# Patient Record
Sex: FEMALE | Race: WHITE | NOT HISPANIC OR LATINO | Employment: FULL TIME | URBAN - METROPOLITAN AREA
[De-identification: names, ages, dates, MRNs, and addresses within clinical notes are randomized per-mention and may not be internally consistent; named-entity substitution may affect disease eponyms.]

---

## 2017-05-06 ENCOUNTER — COMMUNICATION - HEALTHEAST (OUTPATIENT)
Dept: FAMILY MEDICINE | Facility: CLINIC | Age: 47
End: 2017-05-06

## 2017-05-08 ENCOUNTER — HOSPITAL ENCOUNTER (OUTPATIENT)
Dept: MAMMOGRAPHY | Facility: HOSPITAL | Age: 47
Discharge: HOME | End: 2017-05-08
Attending: FAMILY MEDICINE

## 2017-05-08 DIAGNOSIS — Z12.31 VISIT FOR SCREENING MAMMOGRAM: ICD-10-CM

## 2017-10-02 ENCOUNTER — COMMUNICATION - HEALTHEAST (OUTPATIENT)
Dept: FAMILY MEDICINE | Facility: CLINIC | Age: 47
End: 2017-10-02

## 2017-10-02 DIAGNOSIS — R52 PAIN: ICD-10-CM

## 2017-10-30 ENCOUNTER — COMMUNICATION - HEALTHEAST (OUTPATIENT)
Dept: FAMILY MEDICINE | Facility: CLINIC | Age: 47
End: 2017-10-30

## 2017-10-30 DIAGNOSIS — Z79.899 MEDICATION MANAGEMENT: ICD-10-CM

## 2018-01-18 ENCOUNTER — COMMUNICATION - HEALTHEAST (OUTPATIENT)
Dept: FAMILY MEDICINE | Facility: CLINIC | Age: 48
End: 2018-01-18

## 2018-01-18 DIAGNOSIS — Z79.899 MEDICATION MANAGEMENT: ICD-10-CM

## 2018-04-06 ENCOUNTER — OFFICE VISIT - HEALTHEAST (OUTPATIENT)
Dept: FAMILY MEDICINE | Facility: CLINIC | Age: 48
End: 2018-04-06

## 2018-04-06 DIAGNOSIS — S93.409A ANKLE SPRAIN: ICD-10-CM

## 2018-05-21 ENCOUNTER — HOSPITAL ENCOUNTER (OUTPATIENT)
Dept: MAMMOGRAPHY | Facility: CLINIC | Age: 48
Discharge: HOME | End: 2018-05-21
Attending: FAMILY MEDICINE

## 2018-05-21 DIAGNOSIS — Z12.31 VISIT FOR SCREENING MAMMOGRAM: ICD-10-CM

## 2019-03-28 ENCOUNTER — COMMUNICATION - HEALTHEAST (OUTPATIENT)
Dept: FAMILY MEDICINE | Facility: CLINIC | Age: 49
End: 2019-03-28

## 2019-03-28 DIAGNOSIS — Z79.899 MEDICATION MANAGEMENT: ICD-10-CM

## 2019-04-10 ENCOUNTER — COMMUNICATION - HEALTHEAST (OUTPATIENT)
Dept: FAMILY MEDICINE | Facility: CLINIC | Age: 49
End: 2019-04-10

## 2019-04-10 DIAGNOSIS — R52 PAIN: ICD-10-CM

## 2019-06-07 ENCOUNTER — HOSPITAL ENCOUNTER (OUTPATIENT)
Dept: MAMMOGRAPHY | Facility: CLINIC | Age: 49
Discharge: HOME | End: 2019-06-07
Attending: FAMILY MEDICINE

## 2019-06-07 DIAGNOSIS — Z12.31 VISIT FOR SCREENING MAMMOGRAM: ICD-10-CM

## 2019-07-16 ENCOUNTER — COMMUNICATION - HEALTHEAST (OUTPATIENT)
Dept: FAMILY MEDICINE | Facility: CLINIC | Age: 49
End: 2019-07-16

## 2019-07-17 ENCOUNTER — OFFICE VISIT - HEALTHEAST (OUTPATIENT)
Dept: FAMILY MEDICINE | Facility: CLINIC | Age: 49
End: 2019-07-17

## 2019-07-17 DIAGNOSIS — R19.7 DIARRHEA, UNSPECIFIED TYPE: ICD-10-CM

## 2019-07-18 ENCOUNTER — AMBULATORY - HEALTHEAST (OUTPATIENT)
Dept: LAB | Facility: CLINIC | Age: 49
End: 2019-07-18

## 2019-07-18 DIAGNOSIS — R19.7 DIARRHEA, UNSPECIFIED TYPE: ICD-10-CM

## 2019-07-19 LAB
G LAMBLIA AG STL QL IA: NORMAL
O+P STL MICRO: NORMAL
O+P STL MICRO: NORMAL
SHIGA TOXIN 1: NEGATIVE
SHIGA TOXIN 2: NEGATIVE

## 2019-07-20 ENCOUNTER — COMMUNICATION - HEALTHEAST (OUTPATIENT)
Dept: FAMILY MEDICINE | Facility: CLINIC | Age: 49
End: 2019-07-20

## 2019-07-21 LAB — BACTERIA SPEC CULT: NORMAL

## 2019-08-04 ENCOUNTER — AMBULATORY - HEALTHEAST (OUTPATIENT)
Dept: FAMILY MEDICINE | Facility: CLINIC | Age: 49
End: 2019-08-04

## 2019-08-04 DIAGNOSIS — R19.7 DIARRHEA, UNSPECIFIED TYPE: ICD-10-CM

## 2019-10-15 ENCOUNTER — COMMUNICATION - HEALTHEAST (OUTPATIENT)
Dept: FAMILY MEDICINE | Facility: CLINIC | Age: 49
End: 2019-10-15

## 2019-10-15 DIAGNOSIS — L30.9 ECZEMA, UNSPECIFIED TYPE: ICD-10-CM

## 2019-10-17 ENCOUNTER — COMMUNICATION - HEALTHEAST (OUTPATIENT)
Dept: FAMILY MEDICINE | Facility: CLINIC | Age: 49
End: 2019-10-17

## 2019-10-17 DIAGNOSIS — L30.9 ECZEMA, UNSPECIFIED TYPE: ICD-10-CM

## 2020-01-16 ENCOUNTER — OFFICE VISIT - HEALTHEAST (OUTPATIENT)
Dept: FAMILY MEDICINE | Facility: CLINIC | Age: 50
End: 2020-01-16

## 2020-01-16 DIAGNOSIS — Z12.11 SCREENING FOR COLON CANCER: ICD-10-CM

## 2020-01-16 DIAGNOSIS — M25.551 HIP PAIN, RIGHT: ICD-10-CM

## 2020-01-16 DIAGNOSIS — M54.50 LOW BACK PAIN WITHOUT SCIATICA, UNSPECIFIED BACK PAIN LATERALITY, UNSPECIFIED CHRONICITY: ICD-10-CM

## 2020-01-16 DIAGNOSIS — Z78.0 MENOPAUSE: ICD-10-CM

## 2020-01-16 DIAGNOSIS — L30.8 OTHER ECZEMA: ICD-10-CM

## 2020-01-16 DIAGNOSIS — E01.0 THYROMEGALY: ICD-10-CM

## 2020-01-16 DIAGNOSIS — Z00.00 ROUTINE GENERAL MEDICAL EXAMINATION AT A HEALTH CARE FACILITY: ICD-10-CM

## 2020-01-16 LAB
ALBUMIN SERPL-MCNC: 4.4 G/DL (ref 3.5–5)
ALP SERPL-CCNC: 101 U/L (ref 45–120)
ALT SERPL W P-5'-P-CCNC: 16 U/L (ref 0–45)
ANION GAP SERPL CALCULATED.3IONS-SCNC: 12 MMOL/L (ref 5–18)
AST SERPL W P-5'-P-CCNC: 21 U/L (ref 0–40)
BILIRUB SERPL-MCNC: 1.2 MG/DL (ref 0–1)
BUN SERPL-MCNC: 17 MG/DL (ref 8–22)
CALCIUM SERPL-MCNC: 10.3 MG/DL (ref 8.5–10.5)
CHLORIDE BLD-SCNC: 104 MMOL/L (ref 98–107)
CHOLEST SERPL-MCNC: 221 MG/DL
CO2 SERPL-SCNC: 25 MMOL/L (ref 22–31)
CREAT SERPL-MCNC: 0.84 MG/DL (ref 0.6–1.1)
ERYTHROCYTE [DISTWIDTH] IN BLOOD BY AUTOMATED COUNT: 11.3 % (ref 11–14.5)
FASTING STATUS PATIENT QL REPORTED: YES
GFR SERPL CREATININE-BSD FRML MDRD: >60 ML/MIN/1.73M2
GLUCOSE BLD-MCNC: 101 MG/DL (ref 70–125)
HCT VFR BLD AUTO: 44.1 % (ref 35–47)
HDLC SERPL-MCNC: 84 MG/DL
HGB BLD-MCNC: 15.2 G/DL (ref 12–16)
LDLC SERPL CALC-MCNC: 119 MG/DL
MCH RBC QN AUTO: 31.5 PG (ref 27–34)
MCHC RBC AUTO-ENTMCNC: 34.5 G/DL (ref 32–36)
MCV RBC AUTO: 91 FL (ref 80–100)
PLATELET # BLD AUTO: 298 THOU/UL (ref 140–440)
PMV BLD AUTO: 6.9 FL (ref 7–10)
POTASSIUM BLD-SCNC: 4.3 MMOL/L (ref 3.5–5)
PROT SERPL-MCNC: 7.9 G/DL (ref 6–8)
RBC # BLD AUTO: 4.83 MILL/UL (ref 3.8–5.4)
SODIUM SERPL-SCNC: 141 MMOL/L (ref 136–145)
TRIGL SERPL-MCNC: 92 MG/DL
TSH SERPL DL<=0.005 MIU/L-ACNC: 1.23 UIU/ML (ref 0.3–5)
WBC: 6.5 THOU/UL (ref 4–11)

## 2020-01-16 RX ORDER — TRIAMCINOLONE ACETONIDE 5 MG/G
CREAM TOPICAL
Qty: 60 G | Refills: 1 | Status: SHIPPED | OUTPATIENT
Start: 2020-01-16

## 2020-01-16 ASSESSMENT — MIFFLIN-ST. JEOR: SCORE: 1300.18

## 2020-01-17 ENCOUNTER — AMBULATORY - HEALTHEAST (OUTPATIENT)
Dept: SCHEDULING | Facility: CLINIC | Age: 50
End: 2020-01-17

## 2020-01-17 DIAGNOSIS — Z78.0 MENOPAUSE: ICD-10-CM

## 2020-01-17 LAB
25(OH)D3 SERPL-MCNC: 54.5 NG/ML (ref 30–80)
25(OH)D3 SERPL-MCNC: 54.5 NG/ML (ref 30–80)
HPV SOURCE: NORMAL
HUMAN PAPILLOMA VIRUS 16 DNA: NEGATIVE
HUMAN PAPILLOMA VIRUS 18 DNA: NEGATIVE
HUMAN PAPILLOMA VIRUS FINAL DIAGNOSIS: NORMAL
HUMAN PAPILLOMA VIRUS OTHER HR: NEGATIVE
SPECIMEN DESCRIPTION: NORMAL

## 2020-01-21 ENCOUNTER — OFFICE VISIT - HEALTHEAST (OUTPATIENT)
Dept: PHYSICAL THERAPY | Facility: REHABILITATION | Age: 50
End: 2020-01-21

## 2020-01-21 ENCOUNTER — HOSPITAL ENCOUNTER (OUTPATIENT)
Dept: RADIOLOGY | Facility: HOSPITAL | Age: 50
Discharge: HOME | End: 2020-01-21
Attending: PAIN MEDICINE

## 2020-01-21 ENCOUNTER — COMMUNICATION - HEALTHEAST (OUTPATIENT)
Dept: PHYSICAL MEDICINE AND REHAB | Facility: CLINIC | Age: 50
End: 2020-01-21

## 2020-01-21 ENCOUNTER — HOSPITAL ENCOUNTER (OUTPATIENT)
Dept: PHYSICAL MEDICINE AND REHAB | Facility: CLINIC | Age: 50
Discharge: HOME | End: 2020-01-21
Attending: FAMILY MEDICINE

## 2020-01-21 DIAGNOSIS — M54.50 CHRONIC MIDLINE LOW BACK PAIN WITHOUT SCIATICA: ICD-10-CM

## 2020-01-21 DIAGNOSIS — G89.29 CHRONIC BILATERAL LOW BACK PAIN WITHOUT SCIATICA: ICD-10-CM

## 2020-01-21 DIAGNOSIS — M54.50 CHRONIC BILATERAL LOW BACK PAIN WITHOUT SCIATICA: ICD-10-CM

## 2020-01-21 DIAGNOSIS — M79.18 MYOFASCIAL PAIN: ICD-10-CM

## 2020-01-21 DIAGNOSIS — M62.81 MUSCLE WEAKNESS (GENERALIZED): ICD-10-CM

## 2020-01-21 DIAGNOSIS — G89.29 CHRONIC MIDLINE LOW BACK PAIN WITHOUT SCIATICA: ICD-10-CM

## 2020-01-21 DIAGNOSIS — M70.61 GREATER TROCHANTERIC BURSITIS OF RIGHT HIP: ICD-10-CM

## 2020-01-21 DIAGNOSIS — M25.551 RIGHT HIP PAIN: ICD-10-CM

## 2020-01-21 RX ORDER — HYDROCORTISONE 2.5 %
CREAM (GRAM) TOPICAL
Status: SHIPPED | COMMUNITY
Start: 2018-11-02 | End: 2022-09-27

## 2020-01-22 ENCOUNTER — HOSPITAL ENCOUNTER (OUTPATIENT)
Dept: ULTRASOUND IMAGING | Facility: HOSPITAL | Age: 50
Discharge: HOME | End: 2020-01-22
Attending: FAMILY MEDICINE

## 2020-01-22 DIAGNOSIS — E01.0 THYROMEGALY: ICD-10-CM

## 2020-01-28 ENCOUNTER — OFFICE VISIT - HEALTHEAST (OUTPATIENT)
Dept: PHYSICAL THERAPY | Facility: REHABILITATION | Age: 50
End: 2020-01-28

## 2020-01-28 DIAGNOSIS — M62.81 MUSCLE WEAKNESS (GENERALIZED): ICD-10-CM

## 2020-01-28 DIAGNOSIS — M54.50 CHRONIC MIDLINE LOW BACK PAIN WITHOUT SCIATICA: ICD-10-CM

## 2020-01-28 DIAGNOSIS — G89.29 CHRONIC MIDLINE LOW BACK PAIN WITHOUT SCIATICA: ICD-10-CM

## 2020-01-28 DIAGNOSIS — M25.551 RIGHT HIP PAIN: ICD-10-CM

## 2020-02-04 ENCOUNTER — OFFICE VISIT - HEALTHEAST (OUTPATIENT)
Dept: PHYSICAL THERAPY | Facility: REHABILITATION | Age: 50
End: 2020-02-04

## 2020-02-04 DIAGNOSIS — M25.551 RIGHT HIP PAIN: ICD-10-CM

## 2020-02-04 DIAGNOSIS — G89.29 CHRONIC MIDLINE LOW BACK PAIN WITHOUT SCIATICA: ICD-10-CM

## 2020-02-04 DIAGNOSIS — M62.81 MUSCLE WEAKNESS (GENERALIZED): ICD-10-CM

## 2020-02-04 DIAGNOSIS — M54.50 CHRONIC MIDLINE LOW BACK PAIN WITHOUT SCIATICA: ICD-10-CM

## 2020-02-11 ENCOUNTER — RECORDS - HEALTHEAST (OUTPATIENT)
Dept: ADMINISTRATIVE | Facility: OTHER | Age: 50
End: 2020-02-11

## 2020-02-11 LAB — COLOGUARD-ABSTRACT: NEGATIVE

## 2020-02-20 ENCOUNTER — OFFICE VISIT - HEALTHEAST (OUTPATIENT)
Dept: PHYSICAL THERAPY | Facility: REHABILITATION | Age: 50
End: 2020-02-20

## 2020-02-20 DIAGNOSIS — M62.81 MUSCLE WEAKNESS (GENERALIZED): ICD-10-CM

## 2020-02-20 DIAGNOSIS — G89.29 CHRONIC MIDLINE LOW BACK PAIN WITHOUT SCIATICA: ICD-10-CM

## 2020-02-20 DIAGNOSIS — M54.50 CHRONIC MIDLINE LOW BACK PAIN WITHOUT SCIATICA: ICD-10-CM

## 2020-02-20 DIAGNOSIS — M25.551 RIGHT HIP PAIN: ICD-10-CM

## 2020-02-21 ENCOUNTER — HOSPITAL ENCOUNTER (OUTPATIENT)
Dept: PHYSICAL MEDICINE AND REHAB | Facility: CLINIC | Age: 50
Discharge: HOME | End: 2020-02-21
Attending: PAIN MEDICINE

## 2020-02-21 DIAGNOSIS — M70.61 GREATER TROCHANTERIC BURSITIS OF RIGHT HIP: ICD-10-CM

## 2020-02-21 DIAGNOSIS — M47.816 SPONDYLOSIS OF LUMBAR REGION WITHOUT MYELOPATHY OR RADICULOPATHY: ICD-10-CM

## 2020-02-21 DIAGNOSIS — M79.18 MYOFASCIAL PAIN: ICD-10-CM

## 2020-02-26 ENCOUNTER — RECORDS - HEALTHEAST (OUTPATIENT)
Dept: HEALTH INFORMATION MANAGEMENT | Facility: CLINIC | Age: 50
End: 2020-02-26

## 2020-02-26 ENCOUNTER — COMMUNICATION - HEALTHEAST (OUTPATIENT)
Dept: FAMILY MEDICINE | Facility: CLINIC | Age: 50
End: 2020-02-26

## 2020-02-28 ENCOUNTER — HOSPITAL ENCOUNTER (OUTPATIENT)
Dept: PHYSICAL MEDICINE AND REHAB | Facility: CLINIC | Age: 50
Discharge: HOME | End: 2020-02-28
Attending: PAIN MEDICINE

## 2020-02-28 DIAGNOSIS — M70.61 GREATER TROCHANTERIC BURSITIS OF RIGHT HIP: ICD-10-CM

## 2020-03-03 ENCOUNTER — COMMUNICATION - HEALTHEAST (OUTPATIENT)
Dept: FAMILY MEDICINE | Facility: CLINIC | Age: 50
End: 2020-03-03

## 2020-06-11 ENCOUNTER — HOSPITAL ENCOUNTER (OUTPATIENT)
Dept: MAMMOGRAPHY | Facility: CLINIC | Age: 50
Discharge: HOME | End: 2020-06-11
Attending: FAMILY MEDICINE

## 2020-06-11 DIAGNOSIS — Z12.31 VISIT FOR SCREENING MAMMOGRAM: ICD-10-CM

## 2020-09-17 ENCOUNTER — COMMUNICATION - HEALTHEAST (OUTPATIENT)
Dept: FAMILY MEDICINE | Facility: CLINIC | Age: 50
End: 2020-09-17

## 2020-09-17 DIAGNOSIS — Z79.899 MEDICATION MANAGEMENT: ICD-10-CM

## 2020-09-18 RX ORDER — SUMATRIPTAN 50 MG/1
TABLET, FILM COATED ORAL
Qty: 12 TABLET | Refills: 3 | Status: SHIPPED | OUTPATIENT
Start: 2020-09-18 | End: 2022-01-01

## 2020-09-29 ENCOUNTER — COMMUNICATION - HEALTHEAST (OUTPATIENT)
Dept: HEALTH INFORMATION MANAGEMENT | Facility: CLINIC | Age: 50
End: 2020-09-29

## 2021-02-02 ENCOUNTER — COMMUNICATION - HEALTHEAST (OUTPATIENT)
Dept: FAMILY MEDICINE | Facility: CLINIC | Age: 51
End: 2021-02-02

## 2021-02-02 DIAGNOSIS — R52 PAIN: ICD-10-CM

## 2021-02-02 RX ORDER — CYCLOBENZAPRINE HCL 10 MG
10 TABLET ORAL 3 TIMES DAILY PRN
Qty: 60 TABLET | Refills: 3 | Status: SHIPPED | OUTPATIENT
Start: 2021-02-02 | End: 2022-09-27

## 2021-03-22 ENCOUNTER — OFFICE VISIT - HEALTHEAST (OUTPATIENT)
Dept: FAMILY MEDICINE | Facility: CLINIC | Age: 51
End: 2021-03-22

## 2021-03-22 DIAGNOSIS — Z00.00 ROUTINE GENERAL MEDICAL EXAMINATION AT A HEALTH CARE FACILITY: ICD-10-CM

## 2021-03-22 DIAGNOSIS — N95.2 ATROPHIC VAGINITIS: ICD-10-CM

## 2021-03-22 DIAGNOSIS — M76.891 ENTHESOPATHY OF RIGHT HIP REGION: ICD-10-CM

## 2021-03-22 DIAGNOSIS — Z12.31 VISIT FOR SCREENING MAMMOGRAM: ICD-10-CM

## 2021-03-22 DIAGNOSIS — E01.0 THYROMEGALY: ICD-10-CM

## 2021-03-22 LAB
ALBUMIN SERPL-MCNC: 4.3 G/DL (ref 3.5–5)
ALP SERPL-CCNC: 84 U/L (ref 45–120)
ALT SERPL W P-5'-P-CCNC: 15 U/L (ref 0–45)
ANION GAP SERPL CALCULATED.3IONS-SCNC: 12 MMOL/L (ref 5–18)
AST SERPL W P-5'-P-CCNC: 19 U/L (ref 0–40)
BILIRUB SERPL-MCNC: 1 MG/DL (ref 0–1)
BUN SERPL-MCNC: 15 MG/DL (ref 8–22)
CALCIUM SERPL-MCNC: 9.4 MG/DL (ref 8.5–10.5)
CHLORIDE BLD-SCNC: 105 MMOL/L (ref 98–107)
CHOLEST SERPL-MCNC: 199 MG/DL
CO2 SERPL-SCNC: 24 MMOL/L (ref 22–31)
CREAT SERPL-MCNC: 0.77 MG/DL (ref 0.6–1.1)
ERYTHROCYTE [DISTWIDTH] IN BLOOD BY AUTOMATED COUNT: 11.8 % (ref 11–14.5)
FASTING STATUS PATIENT QL REPORTED: YES
GFR SERPL CREATININE-BSD FRML MDRD: >60 ML/MIN/1.73M2
GLUCOSE BLD-MCNC: 97 MG/DL (ref 70–125)
HCT VFR BLD AUTO: 40.5 % (ref 35–47)
HDLC SERPL-MCNC: 86 MG/DL
HGB BLD-MCNC: 13.8 G/DL (ref 12–16)
LDLC SERPL CALC-MCNC: 103 MG/DL
MCH RBC QN AUTO: 31.2 PG (ref 27–34)
MCHC RBC AUTO-ENTMCNC: 34.1 G/DL (ref 32–36)
MCV RBC AUTO: 92 FL (ref 80–100)
PLATELET # BLD AUTO: 283 THOU/UL (ref 140–440)
PMV BLD AUTO: 8.5 FL (ref 7–10)
POTASSIUM BLD-SCNC: 4.1 MMOL/L (ref 3.5–5)
PROT SERPL-MCNC: 7.2 G/DL (ref 6–8)
RBC # BLD AUTO: 4.42 MILL/UL (ref 3.8–5.4)
SODIUM SERPL-SCNC: 141 MMOL/L (ref 136–145)
TRIGL SERPL-MCNC: 50 MG/DL
TSH SERPL DL<=0.005 MIU/L-ACNC: 1.1 UIU/ML (ref 0.3–5)
WBC: 6.7 THOU/UL (ref 4–11)

## 2021-03-22 ASSESSMENT — MIFFLIN-ST. JEOR: SCORE: 1210.76

## 2021-03-23 LAB
25(OH)D3 SERPL-MCNC: 58.7 NG/ML (ref 30–80)
25(OH)D3 SERPL-MCNC: 58.7 NG/ML (ref 30–80)

## 2021-04-15 ENCOUNTER — AMBULATORY - HEALTHEAST (OUTPATIENT)
Dept: FAMILY MEDICINE | Facility: CLINIC | Age: 51
End: 2021-04-15

## 2021-04-15 ENCOUNTER — COMMUNICATION - HEALTHEAST (OUTPATIENT)
Dept: FAMILY MEDICINE | Facility: CLINIC | Age: 51
End: 2021-04-15

## 2021-04-15 DIAGNOSIS — M25.559 HIP PAIN: ICD-10-CM

## 2021-04-22 ENCOUNTER — HOSPITAL ENCOUNTER (OUTPATIENT)
Dept: PHYSICAL MEDICINE AND REHAB | Facility: CLINIC | Age: 51
Discharge: HOME | End: 2021-04-22
Attending: PAIN MEDICINE

## 2021-04-22 DIAGNOSIS — M47.816 SPONDYLOSIS OF LUMBAR REGION WITHOUT MYELOPATHY OR RADICULOPATHY: ICD-10-CM

## 2021-04-22 DIAGNOSIS — M70.61 GREATER TROCHANTERIC BURSITIS OF RIGHT HIP: ICD-10-CM

## 2021-04-22 DIAGNOSIS — M79.18 MYOFASCIAL PAIN: ICD-10-CM

## 2021-04-22 RX ORDER — NAPROXEN SODIUM 220 MG
220 TABLET ORAL PRN
Status: SHIPPED | COMMUNITY
Start: 2021-04-22

## 2021-04-29 ENCOUNTER — HOSPITAL ENCOUNTER (OUTPATIENT)
Dept: PHYSICAL MEDICINE AND REHAB | Facility: CLINIC | Age: 51
Discharge: HOME | End: 2021-04-29
Attending: PAIN MEDICINE

## 2021-04-29 DIAGNOSIS — M47.816 SPONDYLOSIS OF LUMBAR REGION WITHOUT MYELOPATHY OR RADICULOPATHY: ICD-10-CM

## 2021-04-29 DIAGNOSIS — M70.61 GREATER TROCHANTERIC BURSITIS OF RIGHT HIP: ICD-10-CM

## 2021-04-29 DIAGNOSIS — M79.18 MYOFASCIAL PAIN: ICD-10-CM

## 2021-05-17 ENCOUNTER — COMMUNICATION - HEALTHEAST (OUTPATIENT)
Dept: FAMILY MEDICINE | Facility: CLINIC | Age: 51
End: 2021-05-17

## 2021-05-17 DIAGNOSIS — B37.31 YEAST INFECTION OF THE VAGINA: ICD-10-CM

## 2021-05-17 RX ORDER — FLUCONAZOLE 150 MG/1
TABLET ORAL
Qty: 2 TABLET | Refills: 3 | Status: SHIPPED | OUTPATIENT
Start: 2021-05-17 | End: 2022-04-14

## 2021-05-20 ENCOUNTER — RECORDS - HEALTHEAST (OUTPATIENT)
Dept: ADMINISTRATIVE | Facility: OTHER | Age: 51
End: 2021-05-20

## 2021-05-20 ENCOUNTER — OFFICE VISIT - HEALTHEAST (OUTPATIENT)
Dept: FAMILY MEDICINE | Facility: CLINIC | Age: 51
End: 2021-05-20

## 2021-05-20 DIAGNOSIS — N76.0 BV (BACTERIAL VAGINOSIS): ICD-10-CM

## 2021-05-20 DIAGNOSIS — B96.89 BV (BACTERIAL VAGINOSIS): ICD-10-CM

## 2021-05-20 DIAGNOSIS — R30.0 DYSURIA: ICD-10-CM

## 2021-05-20 DIAGNOSIS — N76.0 ACUTE VAGINITIS: ICD-10-CM

## 2021-05-20 DIAGNOSIS — N76.2 ACUTE VULVITIS: ICD-10-CM

## 2021-05-20 LAB
ALBUMIN UR-MCNC: NEGATIVE G/DL
APPEARANCE UR: CLEAR
BACTERIA #/AREA URNS HPF: ABNORMAL /[HPF]
BILIRUB UR QL STRIP: NEGATIVE
CLUE CELLS: ABNORMAL
COLOR UR AUTO: YELLOW
GLUCOSE UR STRIP-MCNC: NEGATIVE MG/DL
HGB UR QL STRIP: ABNORMAL
KETONES UR STRIP-MCNC: ABNORMAL MG/DL
LEUKOCYTE ESTERASE UR QL STRIP: ABNORMAL
NITRATE UR QL: NEGATIVE
PH UR STRIP: 5.5 [PH] (ref 5–8)
RBC #/AREA URNS AUTO: ABNORMAL HPF
SP GR UR STRIP: 1.01 (ref 1–1.03)
SQUAMOUS #/AREA URNS AUTO: ABNORMAL LPF
TRICHOMONAS, WET PREP: ABNORMAL
UROBILINOGEN UR STRIP-ACNC: ABNORMAL
WBC #/AREA URNS AUTO: ABNORMAL HPF
YEAST, WET PREP: ABNORMAL

## 2021-05-20 RX ORDER — METRONIDAZOLE 7.5 MG/G
GEL VAGINAL
Qty: 70 G | Refills: 0 | Status: SHIPPED | OUTPATIENT
Start: 2021-05-20 | End: 2022-04-14

## 2021-05-21 LAB
BACTERIA SPEC CULT: NO GROWTH
HERPES SPECIMEN DESCRIPTION: ABNORMAL
HSV COMMENT: ABNORMAL
HSV TYPE 1 PCR: DETECTED
HSV TYPE 2 PCR: NOT DETECTED

## 2021-05-23 ENCOUNTER — COMMUNICATION - HEALTHEAST (OUTPATIENT)
Dept: FAMILY MEDICINE | Facility: CLINIC | Age: 51
End: 2021-05-23
Payer: COMMERCIAL

## 2021-05-23 LAB — BACTERIA SPEC CULT: NORMAL

## 2021-05-24 ENCOUNTER — AMBULATORY - HEALTHEAST (OUTPATIENT)
Dept: FAMILY MEDICINE | Facility: CLINIC | Age: 51
End: 2021-05-24

## 2021-05-24 ENCOUNTER — RECORDS - HEALTHEAST (OUTPATIENT)
Dept: ADMINISTRATIVE | Facility: CLINIC | Age: 51
End: 2021-05-24

## 2021-05-24 DIAGNOSIS — B00.9 HERPES SIMPLEX VIRUS INFECTION: ICD-10-CM

## 2021-05-24 DIAGNOSIS — A60.00 GENITAL HERPES SIMPLEX TYPE 1 INFECTION: ICD-10-CM

## 2021-05-27 VITALS — WEIGHT: 144.5 LBS

## 2021-05-27 VITALS
SYSTOLIC BLOOD PRESSURE: 128 MMHG | DIASTOLIC BLOOD PRESSURE: 72 MMHG | RESPIRATION RATE: 16 BRPM | HEART RATE: 81 BPM | TEMPERATURE: 98.7 F

## 2021-05-27 NOTE — TELEPHONE ENCOUNTER
Refill Request  Did you contact pharmacy: No  Medication name:   Requested Prescriptions     Pending Prescriptions Disp Refills     SUMAtriptan (IMITREX) 50 MG tablet 12 tablet 3     Who prescribed the medication: Latesha  [facility] and [facility]  Is patient out of medication: unsure  Patient notified refills processed in 72 hours:  no  Okay to leave a detailed message: no  Patient uses mychart

## 2021-05-27 NOTE — TELEPHONE ENCOUNTER
RN cannot approve Refill Request    RN can NOT refill this medication med is not covered by policy/route to provider       . Last office visit: Visit date not found Last Physical: 11/11/2015 Last MTM visit: Visit date not found Last visit same specialty: 4/6/2018 Dominik Winters MD.  Next visit within 3 mo: Visit date not found  Next physical within 3 mo: Visit date not found      Mariely Schneider, [facility] Triage/Med Refill 4/10/2019    Requested Prescriptions   Pending Prescriptions Disp Refills     cyclobenzaprine (FLEXERIL) 10 MG tablet 60 tablet 0     Sig: Take 1 tablet (10 mg total) by mouth 3 (three) times a day as needed.       There is no refill protocol information for this order

## 2021-05-28 ENCOUNTER — RECORDS - HEALTHEAST (OUTPATIENT)
Dept: ADMINISTRATIVE | Facility: CLINIC | Age: 51
End: 2021-05-28

## 2021-05-30 ENCOUNTER — RECORDS - HEALTHEAST (OUTPATIENT)
Dept: ADMINISTRATIVE | Facility: CLINIC | Age: 51
End: 2021-05-30

## 2021-05-30 NOTE — PROGRESS NOTES
Assessment:   1. Diarrhea, unspecified type  Culture, Stool    Ova and Parasite, Stool    Ova and Parasite, Stool    Ova and Parasite, Stool    Giardia Detection, Stool    sulfamethoxazole-trimethoprim (BACTRIM DS) 800-160 mg per tablet       Plan:   Patient Instructions   Stool cultures ordered.     If diarrhea persists start Bactrim once a day, to take for 7-10 days.     Follow-up in about 3 months for a physcial, fasting.        Subjective:  Chief Complaint   Patient presents with     Diarrhea     c/o diarrhea x 1 wk. pt is leaving to go out of town and is afraid to eat anything that might upset her stomach      Frances Mane, a 48 y.o. year old, comes in to clinic with complaints of diarrhea. The patient had diarrhea with abdominal cramping about 4-5 times on 7/6/19. After that, she had it about 3-4 times daily until 7/14/19. She did not eat at all on 7/14 and did not have diarrhea, but symptoms returned after dinner on 7/15 and have continued until 7/16. Today she had loose stool, but no diarrhea. She has been taking Pepto Bismol. She notes that the weekend of 7/4/19 [place] and [place]. She denies hematochezia or chills. She has had hot flashes, but attributes this to menopause. The patient notes she began taking probiotics in 1/2019.     Current Outpatient Medications   Medication Sig     calcium carbonate-vitamin D2 500 mg(1,250mg) -200 unit tablet Take 1 tablet by mouth 2 (two) times a day.     calcium polycarbophil (FIBERCON) 625 mg tablet Take 625 mg by mouth daily.     cyclobenzaprine (FLEXERIL) 10 MG tablet Take 1 tablet (10 mg total) by mouth 3 (three) times a day as needed.     FLAXSEED OIL ORAL Take by mouth.     fluconazole (DIFLUCAN) 150 MG tablet TAKE 1 TABLET BY MOUTH ONCE FOR 1 DOSE. MAY REPEAT IN 24 HOURS AS NEEDED. DO NOT EXCEED 2 PER MONTH.     multivitamin therapeutic (THERAGRAN) tablet Take 1 tablet by mouth daily.      OMEGA-3/DHA/EPA/FISH OIL (FISH OIL-OMEGA-3 FATTY ACIDS) 300-1,000 mg capsule Take 2 g by mouth daily.     SUMAtriptan (IMITREX) 50 MG tablet TAKE 1 - 2 TABLETS BY MOUTH EVERY 2 HOURS AS NEEDED. MAX OF 4 TABLETS PER 24 HOURS     sulfamethoxazole-trimethoprim (BACTRIM DS) 800-160 mg per tablet Take 1 tablet by mouth 2 (two) times a day for 10 days.       Patient Active Problem List   Diagnosis     Migraine Headache     Vestibular Neuronitis     Cervicalgia     Trochanteric Bursitis     Ehrlichiosis Chaffeensis     Thyromegaly       ROS:   Positive for: Diarrhea, abdominal cramping, hot flashes  Denies: Hematochezia, chills, fever    Objective:  /75 (Patient Site: Left Arm, Patient Position: Sitting, Cuff Size: Adult Regular)   Pulse 77   Resp 16   Wt 156 lb 8 oz (71 kg)   BMI 28.62 kg/m    General: No apparent distress  Abdomen: Soft, non-tender, no masses, no rebound or gaurding      ADDITIONAL HISTORY SUMMARIZED (2): None.  DECISION TO OBTAIN EXTRA INFORMATION (1): None.   RADIOLOGY TESTS (1): None.  LABS (1): Labs ordered today.  MEDICINE TESTS (1): None.  INDEPENDENT REVIEW (2 each): None.     Total data points: 1    The visit lasted a total of 20 minutes face to face with the patient. Over 50% of the time was spent counseling and educating the patient about diarrhea.    IPham, am scribing for and in the presence of, Dr. Charlton on  7/17/19 at 5:00pm    I, Dr. Charlton, personally performed the services described in this documentation, as scribed by Pham Herman in my presence, and it is both accurate and complete.

## 2021-05-30 NOTE — PATIENT INSTRUCTIONS - HE
Stool cultures ordered.     If diarrhea persists start Bactrim once a day, to take for 7-10 days.     Follow-up in about 3 months for a physcial, fasting.

## 2021-05-31 ENCOUNTER — RECORDS - HEALTHEAST (OUTPATIENT)
Dept: ADMINISTRATIVE | Facility: CLINIC | Age: 51
End: 2021-05-31

## 2021-06-01 ENCOUNTER — RECORDS - HEALTHEAST (OUTPATIENT)
Dept: ADMINISTRATIVE | Facility: CLINIC | Age: 51
End: 2021-06-01

## 2021-06-01 VITALS — BODY MASS INDEX: 29.23 KG/M2 | WEIGHT: 159.8 LBS

## 2021-06-02 ENCOUNTER — RECORDS - HEALTHEAST (OUTPATIENT)
Dept: ADMINISTRATIVE | Facility: CLINIC | Age: 51
End: 2021-06-02

## 2021-06-02 NOTE — TELEPHONE ENCOUNTER
Medication Question or Clarification  Who is calling: Pharmacy: [facility]  What medication are you calling about? (include dose and sig)   neomycin-polymyxin-dexamethamethasone (POLYDEX) 3.5 mg/g-10,000 unit/g-0.1 % Oint 30 g 0 10/15/2019     Sig: Apply daily as needed for eczema    Sent to pharmacy as: neomycin 3.5 mg/g-polymyxin B 10,000 unit/g-dexameth 0.1 % eye oint (POLYDEX)        Who prescribed the medication?: Dr Charlton  What is your question/concern?: Fax request received from pharmacy states: Script clarification: was this meant for the ophthalmic ointment: it comes 3.5 GM per tube, plus the sig reads for eczema. Please send corrected Rx or call pharmacy to discuss with pharmacist. Thanks   Pharmacy: [facility]  Okay to leave a detailed message?: Yes  Site CMT - Please call the pharmacy to obtain any additional needed information.

## 2021-06-02 NOTE — TELEPHONE ENCOUNTER
Requested Prescriptions     Pending Prescriptions Disp Refills     neomycin-polymyxin-dexamethamethasone (POLYDEX) 3.5 mg/g-10,000 unit/g-0.1 % Oint  0

## 2021-06-03 VITALS — WEIGHT: 156.5 LBS | BODY MASS INDEX: 28.62 KG/M2

## 2021-06-04 VITALS — WEIGHT: 160.1 LBS | BODY MASS INDEX: 29.46 KG/M2

## 2021-06-05 NOTE — TELEPHONE ENCOUNTER
Phone call to patient to review results and provider's recommendations. Results given and explained. Patient is scheduled for PT and her follow up appointments. Encouraged to return sooner if pain worsens or new symptoms. Stated understanding.

## 2021-06-05 NOTE — PROGRESS NOTES
[facility] Daily Progress     Patient Name: Frances Mane  Date: 1/28/2020  Visit #: 2  PTA visit #:    Date of evaluation: 1/21/2020  Referral Diagnosis: Chronic bilateral low back pain without sciatica  Referring provider: Fab Harris*  Visit Diagnosis:     ICD-10-CM    1. Chronic midline low back pain without sciatica M54.5     G89.29    2. Right hip pain M25.551    3. Muscle weakness (generalized) M62.81      Initial Assessment    Frances Mane is a 49 y.o. female who presents to therapy today with chief complaints of low back pain with right hip paint that started in 2013. She had an injection in the hip in 2013 nd it was better after that time but always still there until she went hiking in October and the pain increased after that walk. Patient present with tenderness throughout the right hip in the greater trochanter, ITB, TFL and QL. She as decreased ROM in the lumbar spine in flexion and extension. She has some weakness and pain with MMT noted on the right side. Patient will benefit from skilled therapy to increase ROM, increase strength and flexibility in order to decrease pain and inflammation and improve mobility and return to prior level of function.        Assessment:      HEP/POC compliance is  good .  Patient demonstrates understanding/independence with home program.  Patient is benefitting from skilled physical therapy and is making steady progress toward functional goals.  Patient is appropriate to continue with skilled physical therapy intervention, as indicated by initial plan of care.    Goal Status:  Pt. will demonstrate/verbalize independence in self-management of condition in : 6 weeks  Pt. will be independent with home exercise program in : 6 weeks  Pt. will have improved quality of sleep: with less pain;in 6 weeks;waking less times/night  Pt. will be able to walk : 30 minutes;60 minutes;on uneven/inclined surfaces;with less pain;with less difficulty;for community  "mobility;for exercise/recreation;in 6 weeks  Patient will ascend / descend: stairs;step;with less pain;with less difficulty;in 6 weeks    No data recorded    Plan / Patient Education:     Continue with initial plan of care.  Progress with home program as tolerated.    Subjective:     Pain Rating: sore  The pain is not too bad overall. Most of the pain is soreness to the right lateral hip.   Exercises are going fine, she tried the roller but it was to tender to do, other exercises are going well.     Objective:     Palpation: tenderness to the greater trochanter and the distal ITB on the right side  Lumbar Flexion to ankles, no pain  Lumbar extension WNL mild pain central low back     HEP   - reach and roll   - clam shells  - seated fig 4 stretching    - standing ITB stretching     Treatment Today   1/28/2020  TREATMENT MINUTES COMMENTS   Evaluation     Self-care/ Home management     Manual therapy 23 SL lumbar rotational mobs grade II-III  AP mobs to pelvic rotational mobs   STM to paraspinals, QL and ITB in side-lying position    Neuromuscular Re-education     Therapeutic Activity     Therapeutic Exercises 15  Bike WL 3 x 5 min   Reviewed stretching exercises as above   Performed in clinic  - clam shells L2 band x 12 reps   - supine bridge with hip abduction L2 band 5\" hold x 10 reps   - hooklying hip abduction L2 band x 15 reps     Gait training     Modality__________________                Total 38    Blank areas are intentional and mean the treatment did not include these items.     Soco Winter, PT, DPT, CLT   1/28/2020    "

## 2021-06-05 NOTE — PROGRESS NOTES
[facility]   [facility]-Pel[facility] Initial Evaluation    Patient Name: Frances Mane  Date of evaluation: 1/21/2020  Referral Diagnosis: Chronic bilateral low back pain without sciatica  Referring provider: Fab Harris*  Visit Diagnosis:     ICD-10-CM    1. Chronic midline low back pain without sciatica M54.5     G89.29    2. Right hip pain M25.551    3. Muscle weakness (generalized) M62.81        Assessment:     Frances Mane is a 49 y.o. female who presents to therapy today with chief complaints of low back pain with right hip paint that started in 2013. She had an injection in the hip in 2013 nd it was better after that time but always still there until she went hiking in October and the pain increased after that walk. Patient present with tenderness throughout the right hip in the greater trochanter, ITB, TFL and QL. She as decreased ROM in the lumbar spine in flexion and extension. She has some weakness and pain with MMT noted on the right side. Patient will benefit from skilled therapy to increase ROM, increase strength and flexibility in order to decrease pain and inflammation and improve mobility and return to prior level of function.       Impairments in  pain, posture, ROM, joint mobility, strength  The POC is dynamic and will be modified on an ongoing basis.  Barriers to achieving goals as noted in the assessment section may affect outcome.  Prognosis to achieve goals is  good   Pt. is appropriate for skilled PT intervention as outlined in the Plan of Care (POC).  Pt. is a good candidate for skilled PT services to improve pain levels and function.    Goals:  Pt. will demonstrate/verbalize independence in self-management of condition in : 6 weeks  Pt. will be independent with home exercise program in : 6 weeks  Pt. will have improved quality of sleep: with less pain;in 6 weeks;waking less times/night  Pt. will be able to walk : 30 minutes;60 minutes;on uneven/inclined  surfaces;with less pain;with less difficulty;for community mobility;for exercise/recreation;in 6 weeks  Patient will ascend / descend: stairs;step;with less pain;with less difficulty;in 6 weeks    No data recorded    Patient's expectations/goals are realistic.    Barriers to Learning or Achieving Goals:  Chronicity of the problem.       Plan / Patient Instructions:        Plan of Care:   Authorization / Certification Start Date: 01/21/20  Communication with: Referral Source  Patient Related Instruction: Nature of Condition;Treatment plan and rationale;Self Care instruction;Basis of treatment;Body mechanics;Posture;Precautions;Next steps;Expected outcome  Times per Week: 1-2  Number of Weeks: 6-8  Number of Visits: up to 10 sessions   Discharge Planning: when goals have been met or a plateau in progress has been achieved   Therapeutic Exercise: ROM;Stretching;Strengthening  Neuromuscular Reeducation: kinesio tape;posture;core;balance/proprioception  Manual Therapy: soft tissue mobilization;myofascial release;joint mobilization;muscle energy;strain counterstrain  Modalities: electrical stimulation;TENS;ultrasound;cold pack;hot pack      Plan for next visit: manual therapy as needed, progress hip and core strengthening exercises.      Subjective:         Social information:   Living Situation:single family home   Occupation:[employer]   Work Status:Working full time   Equipment Available: None     History of Present Illness:    Frances is a 49 y.o. female who presents to therapy today with complaints of low back pain and right hip pain that started in 2013 without a specific injury. She has always been active, more recently she went for a hike in October and the pain increased significantly where she was limping when she finished and it has not gotten better. In 2013 she had an injection in the hip which helped bu thte pain never went away fully. sneezing will increase back pain at times. Xrays done today. Was prescribed  Naproxen which she has not started yet. Was instructed to follow up for a month.       Pain Ratin  Pain rating at best: 0  Pain rating at worst: 10  Pain description: aching, soreness and weakness    Functional limitations are described as occurring with:   ascending stairs or curbs  balance  bending  lifting  sleeping  sports or recreation Hiking    Patient reports benefit from:  movement or exercise , anti-inflammatory, pain medication, injection:   type steroid date(s)         Objective:      Note: Items left blank indicates the item was not performed or not indicated at the time of the evaluation.    Patient Outcome Measures :    Modified Oswestry Low Back Pain Disablity Questionnaire  in %: 34     Scores range from 0-100%, where a score of 0% represents minimal pain and maximal function. The minimal clinically important difference is a score reduction of 12%.    Examination  1. Chronic midline low back pain without sciatica     2. Right hip pain     3. Muscle weakness (generalized)       Involved side: Right    Lumbar ROM:  2020  Date: 2020     *Indicate scale AROM AROM AROM   Lumbar Flexion To mid tibia, tight and stiff across the back      Lumbar Extension WNL feel good       Right Left Right Left Right Left   Lumbar Sidebending WNL WNL       Lumbar Rotation WNL WNL       Thoracic Flexion      Thoracic Extension      Thoracic Sidebending         Thoracic Rotation           Lower Extremity Strength:   2020   able to heel and toe walk bilaterally   Date: 2020     LE strength/5 Right Left Right Left Right Left   Hip Flexion (L1-3) 5 5       Hip Extension (L5-S1) 4 painful 5- min pain       Hip Abduction (L4-5) 4 painful  4+       Hip Adduction (L2-3)         Hip External Rotation         Hip Internal Rotation         Knee Extension (L3-4) 5 5       Knee Flexion 5 5       Ankle Dorsiflexion (L4-5) 5 5       Great Toe Extension (L5)         Ankle Plantar flexion (S1) 5 5       Abdominals         Sensation    WNL to light touch in B LEs   Reflex Testing - not assessed today     Palpation: tender greater trochanter on right side, right TFL, ITB, R SIJ and parapsinals in the lumbar spine    Lumbar Special Tests:   1/21/2020  Lumbar Special Tests Right Left SI Tests Right  Left   Quadrant test   SI Compression     Straight leg raise - - SI Distraction     Crossover response - - POSH Test     Slump - - Sacral Thrust     Sit-up test  FADIR - pain -   Trunk extensor endurance test  Resisted Abduction Pain     Prone instability test  Other: Stiff     Pubic shotgun  Other:       Repeated Motion Testing:  Does not centralize  Does not peripheralize    Passive Mobility - Joint Integrity:  Hypomobile    Treatment Today   1/21/2020  TREATMENT MINUTES COMMENTS   Evaluation 30     Self-care/ Home management     Manual therapy 15 SL lumbar rotational mobs grade II-III  AP mobs to pelvic rotational mobs   STM to paraspinals, QL and ITB in side-lying position    Neuromuscular Re-education     Therapeutic Activity     Therapeutic Exercises 15 Instructed in HEP, performed in clinic and handouts given.   HEP   - reach and roll x 10 reps   - clam shells x 10 reps B  - seated fig 4 stretching 30 sec x 3 reps   - standing ITB stretching 30 sec x 3 reps   Foam roll to ITB instructed and demonstrated in clinic today      Gait training     Modality__________________                Total 60     Blank areas are intentional and mean the treatment did not include these items.     PT Evaluation Code: (Please list factors)  Patient History/Comorbidities: as above   Examination: as above   Clinical Presentation: stable   Clinical Decision Making: low     Patient History/  Comorbidities Examination  (body structures and functions, activity limitations, and/or participation restrictions) Clinical Presentation Clinical Decision Making (Complexity)   No documented Comorbidities or personal factors 1-2 Elements Stable and/or uncomplicated  Low   1-2 documented comorbidities or personal factor 3 Elements Evolving clinical presentation with changing characteristics Moderate   3-4 documented comorbidities or personal factors 4 or more Unstable and unpredictable High       Soco Winter PT, DPT, CPT   1/21/2020  3:44 PM

## 2021-06-05 NOTE — PROGRESS NOTES
Assessment/Plan:  1. Routine general medical examination at a health care facility  Lipid Pender FASTING    Gynecologic Cytology (PAP Smear)   2. Menopause  DXA Bone Density Scan   3. Thyromegaly  Comprehensive Metabolic Panel    Thyroid Cascade    HM2(CBC w/o Differential)    US Thyroid   4. Hip pain, right  Ambulatory referral to Spine Care    Vitamin D, Total (25-Hydroxy)   5. Low back pain without sciatica, unspecified back pain laterality, unspecified chronicity  Ambulatory referral to Spine Care   6. Screening for colon cancer  Cologuard   7. Other eczema  triamcinolone (KENALOG) 0.5 % cream       Patient is a 49 y.o. female here for physical exam. See health maintenance section below. Labs as ordered.      If wanting to get Shingrix shot can set a future nurse appointment.     Giving referral to spine clinic for hip and back pain.    For hip pain can start with tylenol as needed, if wishing can take glucosamine chondroitin daily.    Triamcinalone 0.5% cream to use as need, max of 3 times a day for 2 weeks. Do not use on face.     Please see dermatology if you notice any moles changing size, shape, color, or any that have bleeding.    HPI    Chief Complaint   Patient presents with     Annual Exam     pt is fasting      Colon: She denies any blood in her stool.   Health Maintenance: She got her flu shot in September at work.   Menstrual period: The patient reports that it has been 1 and   year since her last period. She says there has been no bleeding since she started menopause.  Right Hip/Low Back: She was positive for ehrlichiosis ago.  She does not feel that could explain her current symptoms.  She says that there are new symptoms of pain walking up hill and stairs. She says that the pain is where she got the shot. Also, it hurts when she crosses her legs and lays in bed. The patient says that there is some lower back pain for some time now. She denies that there is pain going down to her legs.  She feels  like it is across the entire low back versus over her spine specifically.  Skin: She has some eczema and has been using the medication needed. She also has a little spot on her eye.   PFSH:  Grandfather at 90 years old was found with colon  Dad passed away 18 years ago from non-alcoholic liver disease.  Mom has arthritis.   Aunt had thyroid cancer. Patient has been told that she has an enlarged thyroid gland, but tested and results were normal.   She works at [employer].     Health Maintenance   Topic Date Due     HIV SCREENING  NA     PREVENTIVE CARE VISIT  Yearly     COLONOSCOPY  Ordered Cologaurd     ZOSTER VACCINES (1 of 2) If you are interested in the new shingles shot, Shingrix, please check with insurance for coverage either in clinic or at a pharmacy. It is a 2 shot series 2-6 months apart.      MAMMOGRAM  06/07/2020     PAP SMEAR  Today     LIPID  Today     HPV TEST  With pap today     ADVANCE CARE PLANNING  Declined     TD 18+ HE  09/20/2022     INFLUENZA VACCINE RULE BASED  Completed     TDAP ADULT ONE TIME DOSE  Completed     DEXA scan-Ordered     Patient Active Problem List   Diagnosis     Migraine Headache     Vestibular Neuronitis     Cervicalgia     Trochanteric Bursitis     Thyromegaly     Current Outpatient Medications   Medication Sig Note     calcium carbonate-vitamin D2 500 mg(1,250mg) -200 unit tablet Take 1 tablet by mouth 2 (two) times a day.      calcium polycarbophil (FIBERCON) 625 mg tablet Take 625 mg by mouth daily.      FLAXSEED OIL ORAL Take by mouth.      multivitamin therapeutic (THERAGRAN) tablet Take 1 tablet by mouth daily.      neomycin-polymyxin-dexamethamethasone (POLYDEX) 3.5 mg/g-10,000 unit/g-0.1 % Oint Apply to eyelids daily as needed for eczema      OMEGA-3/DHA/EPA/FISH OIL (FISH OIL-OMEGA-3 FATTY ACIDS) 300-1,000 mg capsule Take 2 g by mouth daily.      SUMAtriptan (IMITREX) 50 MG tablet TAKE 1 - 2 TABLETS BY MOUTH EVERY 2 HOURS AS NEEDED. MAX OF 4 TABLETS PER 24 HOURS  "     cyclobenzaprine (FLEXERIL) 10 MG tablet Take 1 tablet (10 mg total) by mouth 3 (three) times a day as needed. 1/16/2020: Prn      fluconazole (DIFLUCAN) 150 MG tablet TAKE 1 TABLET BY MOUTH ONCE FOR 1 DOSE. MAY REPEAT IN 24 HOURS AS NEEDED. DO NOT EXCEED 2 PER MONTH.      triamcinolone (KENALOG) 0.5 % cream Apply to skin lesions 3 times a day for 2 weeks, not on face        Patient is a 49 y.o. female presents for a physical exam.    The following portions of the patient's history were reviewed and updated as appropriate: past medical history, past social history, past surgical history and problem list.    Review of Systems  Pertinent items are noted in HPI.  Immunization History   Administered Date(s) Administered     Hep A, historic 06/21/2007, 11/11/2009     Hep B, historic 01/01/1992     Influenza, seasonal,quad inj 6-35 mos 09/11/2009, 09/20/2012     Novel Influenza [id number], Nasal 11/11/2009     Tdap 11/11/2009, 09/20/2012     Recent Results (from the past 240 hour(s))   HM2(CBC w/o Differential)   Result Value Ref Range    WBC 6.5 4.0 - 11.0 thou/uL    RBC 4.83 3.80 - 5.40 mill/uL    Hemoglobin 15.2 12.0 - 16.0 g/dL    Hematocrit 44.1 35.0 - 47.0 %    MCV 91 80 - 100 fL    MCH 31.5 27.0 - 34.0 pg    MCHC 34.5 32.0 - 36.0 g/dL    RDW 11.3 11.0 - 14.5 %    Platelets 298 140 - 440 thou/uL    MPV 6.9 (L) 7.0 - 10.0 fL     I have had an Advance Directives discussion with the patient.  Objective:    /81 (Patient Site: Left Arm, Patient Position: Sitting, Cuff Size: Adult Regular)   Pulse 76   Resp 16   Ht 5' 1.81\" (1.57 m)   Wt 163 lb 2 oz (74 kg)   BMI 30.02 kg/m        General Appearance:    Alert, cooperative, no distress, appears stated age   Head:    Normocephalic, without obvious abnormality, atraumatic   Eyes:    PERRL, conjunctiva/corneas clear, EOM's intact, fundi     benign, both eyes   Ears:    Normal TM's and external ear canals, both ears   Nose:   Nares normal, septum midline, mucosa " normal, no drainage    or sinus tenderness   Throat:   Lips, mucosa, and tongue normal; teeth and gums normal   Neck:   Supple, symmetrical, trachea midline, no adenopathy;     thyroid:  no enlargement/tenderness/nodules; no carotid    bruit or JVD, Mild thyromegaly without nodules   Back:     Symmetric, no curvature, ROM normal, no CVA tenderness no pain to palpation over spine     Lungs:     Clear to auscultation bilaterally, respirations unlabored   Chest Wall:    No tenderness or deformity    Heart:    Regular rate and rhythm, S1 and S2 normal, no murmur, rub   or gallop   Breast Exam:    No tenderness, masses, or nipple abnormality   Abdomen:     Soft, non-tender, bowel sounds active all four quadrants,     no masses, no organomegaly   Genitalia:    Normal female without lesion, discharge or tenderness       Extremities:   Extremities normal, atraumatic, no cyanosis or edema   Pulses:   2+ and symmetric all extremities   Skin:   Skin color, texture, turgor normal, no rashes or lesions   Lymph nodes:   Cervical, supraclavicular, and axillary nodes normal,    Neurologic:   CNII-XII intact, normal strength, sensation and reflexes     throughout         ADDITIONAL HISTORY SUMMARIZED (2): 07/06/07 note reviewed about neck  DECISION TO OBTAIN EXTRA INFORMATION (1): None.   RADIOLOGY TESTS (1): None.  LABS (1): Labs ordered today.  MEDICINE TESTS (1): Pap smear done today.  INDEPENDENT REVIEW (2 each): None.     The visit lasted a total of 40 minutes face to face with the patient. Over 50% of the time was spent counseling and educating the patient about wellness.    I, Nimisha Lara am scribing for and in the presence of, Dr. Charlton.    I, Dr. Charlton, personally performed the services described in this documentation, as scribed by Nimisha Lara in my presence, and it is both accurate and complete.    Total data points: 4

## 2021-06-05 NOTE — PATIENT INSTRUCTIONS - HE
If wanting to get Shingrix shot can set a future nurse appointment.     Giving referral to spine clinic for hip and back pain.    For hip pain can start with tylenol as needed, if wishing can take glucosamine chondroitin daily.    Triamcinalone 0.5% cream to use as need, max of 3 times a day for 2 weeks. Do not use on face.     Please see dermatology if you notice any moles changing size, shape, color, or any that have bleeding.    If you choose to use [employer] to send a provider a message please keep this very brief.  They should only be used for a follow-up questions to a previous appointment.  New concerns should addressed by a phone call to triage, E -visit or an office visit.  Certainly if it is an emergency call 911.  If there are labor related questions please call [facility] or [facility].  Thank-you.    Your lab results will be communicated to you via letter, delicioust message or phone call when they are all back.  Please refrain from sending messages on one specific lab until they are all back.  Thank you.      Health Maintenance   Topic Date Due     HIV SCREENING  NA     PREVENTIVE CARE VISIT  Yearly     COLONOSCOPY  Ordered Cologaurd     ZOSTER VACCINES (1 of 2) If you are interested in the new shingles shot, Shingrix, please check with insurance for coverage either in clinic or at a pharmacy. It is a 2 shot series 2-6 months apart.      MAMMOGRAM  06/07/2020     PAP SMEAR  Today     LIPID  Today     HPV TEST  With pap today     ADVANCE CARE PLANNING  Declined     TD 18+ HE  09/20/2022     INFLUENZA VACCINE RULE BASED  Completed     TDAP ADULT ONE TIME DOSE  Completed     DEXA scan-Ordered

## 2021-06-05 NOTE — PATIENT INSTRUCTIONS - HE
Discussed the importance of core strengthening, ROM, stretching exercises with the patient and how each of these entities is important in decreasing pain.  Explained to the patient that the purpose of physical therapy is to teach the patient a home exercise program.  These exercises need to be performed every day in order to decrease pain and prevent future occurrences of pain.        Have ordered an x-ray of your low back and your right hip.  Once I reviewed the images I will have 1 of our nurses give you call with results and recommendations.    I have also ordered naproxen 500 mg that you can take twice a day as needed with food.  Please do not take ibuprofen or Aleve while you are taking this.    ~Please call Nurse Navigation line [phone] with any questions or concerns about your treatment plan, if symptoms worsen and you would like to be seen urgently, or if you have problems controlling bladder and bowel function.

## 2021-06-05 NOTE — PROGRESS NOTES
[facility] Daily Progress     Patient Name: Frances Mane  Date: 2/4/2020  Visit #: 3  PTA visit #:    Date of evaluation: 1/21/2020  Referral Diagnosis: Chronic bilateral low back pain without sciatica  Referring provider: Fab Harris*  Visit Diagnosis:     ICD-10-CM    1. Chronic midline low back pain without sciatica M54.5     G89.29    2. Right hip pain M25.551    3. Muscle weakness (generalized) M62.81      Initial Assessment    Frances Mane is a 49 y.o. female who presents to therapy today with chief complaints of low back pain with right hip paint that started in 2013. She had an injection in the hip in 2013 nd it was better after that time but always still there until she went hiking in October and the pain increased after that walk. Patient present with tenderness throughout the right hip in the greater trochanter, ITB, TFL and QL. She as decreased ROM in the lumbar spine in flexion and extension. She has some weakness and pain with MMT noted on the right side. Patient will benefit from skilled therapy to increase ROM, increase strength and flexibility in order to decrease pain and inflammation and improve mobility and return to prior level of function.        Assessment:      HEP/POC compliance is  good .  Patient demonstrates understanding/independence with home program.  Patient is benefitting from skilled physical therapy and is making steady progress toward functional goals.  Patient is appropriate to continue with skilled physical therapy intervention, as indicated by initial plan of care.    Goal Status:  Pt. will demonstrate/verbalize independence in self-management of condition in : 6 weeks  Pt. will be independent with home exercise program in : 6 weeks  Pt. will have improved quality of sleep: with less pain;in 6 weeks;waking less times/night  Pt. will be able to walk : 30 minutes;60 minutes;on uneven/inclined surfaces;with less pain;with less difficulty;for community  mobility;for exercise/recreation;in 6 weeks  Patient will ascend / descend: stairs;step;with less pain;with less difficulty;in 6 weeks    No data recorded    Plan / Patient Education:     Continue with initial plan of care.  Progress with home program as tolerated.    Subjective:     Pain Rating: sore   The pain is more down towards the knee. Less pain overall. Had some pain in the back yesterday, not sure if she did too much on Sunday. Some stiffness in the back today.   Going to [facility] from Thursday - Monday.     Objective:     Palpation: tenderness to the greater trochanter and the distal ITB on the right side  Lumbar Flexion to ankles, no pain  Lumbar extension WNL mild pain central low back     HEP   - reach and roll   - clam shells L2 band   - seated fig 4 stretching    - standing ITB stretching   - Bridging with band across hips  - hook-lying hip abduction L2 band     Treatment Today   2/4/2020  TREATMENT MINUTES COMMENTS   Evaluation     Self-care/ Home management     Manual therapy 25 SL lumbar rotational mobs grade II-III  AP mobs to pelvic rotational mobs   STM to paraspinals, QL and ITB in side-lying position    Neuromuscular Re-education     Therapeutic Activity     Therapeutic Exercises 5  Bike WL 3 x 5 min   Reviewed stretching exercises as above      Gait training     Modality__________________                Total 30    Blank areas are intentional and mean the treatment did not include these items.     Soco Winter, PT, DPT, CLT   2/4/2020

## 2021-06-05 NOTE — PROGRESS NOTES
ASSESSMENT: Frances Mane is a 49 y.o. female who presents for consultation at the request of HE PCP Anamaria Charlton MD, with a past medical history significant for migraine headache, vestibular neuronitis, cervicalgia, trochanteric bursitis, thyromegaly who presents today for new patient evaluation of low back pain and right lateral thigh pain:    -Overall patient's physical exam is reassuring that she has normal strength and reflexes.  Her low back pain is likely secondary to facet arthropathy and right lateral thigh pain is likely secondary to greater trochanter bursitis with IT band syndrome.    Patient is neurologically intact on exam. No myelopathic or red flag symptoms.     VANDANA Score: 16    WHO 5: 15     Diagnoses and all orders for this visit:    Chronic bilateral low back pain without sciatica  -     naproxen (NAPROSYN) 500 MG tablet; Take 1 tablet (500 mg total) by mouth 2 (two) times a day with meals. Take with food/water to prevent stomach upset.  Dispense: 60 tablet; Refill: 1  -     Ambulatory referral to Physical Therapy  -     XR Lumbar Spine 2 or 3 VWS; Future; Expected date: 01/21/2020  -     XR Hip Right 2 or More VWS; Future; Expected date: 01/21/2020    Greater trochanteric bursitis of right hip  -     naproxen (NAPROSYN) 500 MG tablet; Take 1 tablet (500 mg total) by mouth 2 (two) times a day with meals. Take with food/water to prevent stomach upset.  Dispense: 60 tablet; Refill: 1  -     Ambulatory referral to Physical Therapy  -     XR Lumbar Spine 2 or 3 VWS; Future; Expected date: 01/21/2020  -     XR Hip Right 2 or More VWS; Future; Expected date: 01/21/2020    Myofascial pain  -     naproxen (NAPROSYN) 500 MG tablet; Take 1 tablet (500 mg total) by mouth 2 (two) times a day with meals. Take with food/water to prevent stomach upset.  Dispense: 60 tablet; Refill: 1  -     Ambulatory referral to Physical Therapy  -     XR Lumbar Spine 2 or 3 VWS; Future; Expected date: 01/21/2020  -      XR Hip Right 2 or More VWS; Future; Expected date: 01/21/2020      PLAN:  Reviewed spine anatomy and disease process. Discussed diagnosis and treatment options with the patient today. A shared decision making model was used.  The patient's values and choices were respected. The following represents what was discussed and decided upon by the provider and the patient.      -DIAGNOSTIC TESTS:   --I have ordered x-rays of her lumbar spine and right hip.  Once I reviewed images I will have 1 of our nurses give her a call with results and recommendations.    -PHYSICAL THERAPY: I have ordered physical therapy for her low back and right lateral thigh pain.  Like her to have a home-going exercise program that she can do on a daily basis.    Discussed the importance of core strengthening, ROM, stretching exercises with the patient and how each of these entities is important in decreasing pain.  Explained to the patient that the purpose of physical therapy is to teach the patient a home exercise program.  These exercises need to be performed every day in order to decrease pain and prevent future occurrences of pain.        -MEDICATIONS: I have ordered naproxen 500 mg that she can take up to twice a day as needed with food.  She should not take ibuprofen or Aleve while she is taking this.  -  Discussed multiple medication options today with patient. Discussed risks, side effects, and proper use of medications. Patient verbalized understanding.    -INTERVENTIONS: No interventions at this time.  Discussed risks and benefits of injections with patient today.    -PATIENT EDUCATION: We discussed pain management in a multimodal fashion including physical therapy, medication management, possible future injections.    -FOLLOW-UP:   Patient will follow-up in 4 weeks.    Advised patient to call the [facility] if symptoms worsen or you have problems controlling bladder and bowel function.    ______________________________________________________________________    SUBJECTIVE:  HPI:  Frances Mane  Is a 49 y.o. female who presents today for new patient evaluation of low back pain.  The patient was seen by her primary care provider on 1/16/2020 with low back and right hip pain.  Patient was referred to the spine center for further evaluation.  Patient reports that she started feeling pain after a long hike in the fall 2019.  She notices that pain is worse when going uphill as well as going up stairs.  Prolonged walking also worsens pain.  Pain is improved with sitting and lying as well as Tylenol.  She notes that in 2013 she had right lateral thigh pain and was given a very trochanter bursa injection at the time and that was very helpful.  Patient has not had physical therapy for 17 years for her low back.  She had this when she was pregnant and had sciatica at the time.  She has been taking Tylenol for pain and finds it is helpful she will occasionally take Flexeril but this is more for her neck pain.  This is under control.  Her pain today is 5/10 as worst is 10/10 is best a 0/10.  She denies any bowel or bladder changes, fevers, chills, unintentional weight loss.    -Treatment to Date: None    -Medications:    Current Outpatient Medications on File Prior to Encounter   Medication Sig Dispense Refill     calcium carbonate-vitamin D2 500 mg(1,250mg) -200 unit tablet Take 1 tablet by mouth 2 (two) times a day.       calcium polycarbophil (FIBERCON) 625 mg tablet Take 625 mg by mouth daily.       cyclobenzaprine (FLEXERIL) 10 MG tablet Take 1 tablet (10 mg total) by mouth 3 (three) times a day as needed. 60 tablet 3     FLAXSEED OIL ORAL Take by mouth.       fluconazole (DIFLUCAN) 150 MG tablet TAKE 1 TABLET BY MOUTH ONCE FOR 1 DOSE. MAY REPEAT IN 24 HOURS AS NEEDED. DO NOT EXCEED 2 PER MONTH. 2 tablet 1     hydrocortisone 2.5 % cream Apply to inflamed skin 2-3 times daily until clear but not more than  2-3 weeks at a time       multivitamin therapeutic (THERAGRAN) tablet Take 1 tablet by mouth daily.       neomycin-polymyxin-dexamethamethasone (POLYDEX) 3.5 mg/g-10,000 unit/g-0.1 % Oint Apply to eyelids daily as needed for eczema 30 g 0     OMEGA-3/DHA/EPA/FISH OIL (FISH OIL-OMEGA-3 FATTY ACIDS) 300-1,000 mg capsule Take 2 g by mouth daily.       SUMAtriptan (IMITREX) 50 MG tablet TAKE 1 - 2 TABLETS BY MOUTH EVERY 2 HOURS AS NEEDED. MAX OF 4 TABLETS PER 24 HOURS 12 tablet 3     triamcinolone (KENALOG) 0.5 % cream Apply to skin lesions 3 times a day for 2 weeks, not on face 60 g 1     No current facility-administered medications on file prior to encounter.        Allergies   Allergen Reactions     Cefadroxil      Erythromycin Base      rash, vomit       Latex      Morphine        Past Medical History:   Diagnosis Date     Ehrlichiosis Chaffeensis     Created by Conversion         Patient Active Problem List   Diagnosis     Migraine Headache     Vestibular Neuronitis     Cervicalgia     Trochanteric Bursitis     Thyromegaly       Past Surgical History:   Procedure Laterality Date     [place]  DELIVERY ONLY      Description:  Section;  Recorded: 2009;  Comments: 10-01,        Family History   Problem Relation Age of Onset     Cancer Maternal Aunt 69        thyroid, breast     Breast cancer Maternal Aunt 69     Colon cancer Maternal Grandfather 80     Osteoporosis Mother      Depression Father      Skin cancer Father      Dementia Maternal Grandmother        Reviewed past medical, surgical, and family history with patient found on new patient intake packet located in EMR Media tab.     SOCIAL HX: She is a public health nurse.  She is .  She denies smoking or using recreational drugs.  She drinks alcohol occasionally.    ROS: Specifically negative for bowel/bladder dysfunction, balance changes, headache, dizziness, foot drop, fevers, chills, appetite changes, nausea/vomiting, unexplained  weight loss. Otherwise 13 systems reviewed are negative. Please see the patient's intake questionnaire from today for details.    OBJECTIVE:  /62 (Patient Site: Right Arm, Patient Position: Sitting, Cuff Size: Adult Large)   Pulse 81   Temp 97.8  F (36.6  C) (Oral)   Resp 18   SpO2 98%     PHYSICAL EXAMINATION:    --CONSTITUTIONAL:  Vital signs as above.  No acute distress.  The patient is well nourished and well groomed.  --PSYCHIATRIC:  Appropriate mood and affect. The patient is awake, alert, oriented to person, place, time and answering questions appropriately with clear speech.    --SKIN:  Skin over the face, bilateral lower extremities, and posterior torso is clean, dry, intact without rashes.    --RESPIRATORY: Normal rhythm and effort. No abnormal accessory muscle breathing patterns noted.   --ABDOMINAL:  Soft, non-distended, non-tender throughout all quadrants.   --STANDING EXAMINATION:  Normal lumbar lordosis noted, no lateral shift.  --MUSCULOSKELETAL: Lumbar spine inspection reveals no evidence of deformity. Range of motion is not limited in lumbar flexion, extension, lateral rotation.  She has tenderness palpation along her low lumbar paraspinal muscles bilaterally as well as over her right greater trochanter area.  Straight leg raising in the supine position is negative to radicular pain.   --SACROILIAC JOINT: Negative distraction.  Negative Gila's with reproduction of pain to affected extremity. One Finger point test negative.  --GROSS MOTOR: Gait is non-antalgic. Easily arises from a seated position. Toe walking and heel walking are normal without significant difficulty.    --LOWER EXTREMITY MOTOR TESTING:  Plantar flexion left 5/5, right 5/5   Dorsiflexion left 5/5, right 5/5   Great toe MTP extension left 5/5, right 5/5  Knee flexion left 5/5, right 5/5  Knee extension left 5/5, right 5/5   Hip flexion left 5/5, right 5/5  Hip abduction left 5/5, right 5/5  Hip adduction left 5/5, right  5/5   --HIPS: Full range of motion bilaterally.  Stinchfield test is negative bilaterally.  --NEUROLOGICAL:  2/4 patellar, medial hamstring, and achilles reflexes bilaterally.  Sensation to light touch is intact in the bilateral L4, L5, and S1 dermatomes. Babinski is negative. No clonus.    --VASCULAR:  2/4 dorsalis pedis and posterior tibialsi pulses bilaterally.  Bilateral lower extremities are warm.  There is no pitting edema of the bilateral lower extremities.    RESULTS: Prior medical records from [facility] primary care provider were reviewed today.

## 2021-06-05 NOTE — TELEPHONE ENCOUNTER
----- Message from Fab Harris, DO sent at 1/21/2020 11:19 AM CST -----  Please call the patient let her know I reviewed x-rays of her low back and hip.  There is some arthritis in the low back and sacroiliac joint left more than right.  This is likely because of pain.  There is normal joint space in the hip.  Recommend the patient do physical therapy and follow-up as scheduled.

## 2021-06-06 NOTE — PATIENT INSTRUCTIONS - HE
I have ordered a bursa injection for your right hip pain.    Please continue with your physical therapy exercises at home.    ~Please call Nurse Navigation line [phone] with any questions or concerns about your treatment plan, if symptoms worsen and you would like to be seen urgently, or if you have problems controlling bladder and bowel function.

## 2021-06-06 NOTE — PROGRESS NOTES
Assessment:     Diagnoses and all orders for this visit:    Greater trochanteric bursitis of right hip  -     OPS US Large Joint Injection Unilateral; Future; Expected date: 02/21/2020    Spondylosis of lumbar region without myelopathy or radiculopathy    Myofascial pain       Frances Mane is a 49 y.o. y.o. female with past medical history significant for migraine headache, vestibular neuronitis, cervicalgia, trochanteric bursitis, thyromegaly who presents today for follow-up regarding low back pain and right lateral thigh pain:    -Patient continues to have back pain and right lateral thigh pain.  The right lateral thigh pain is likely secondary to greater trochanter bursitis.  Low back pain is likely secondary to facet arthropathy.     Plan:     A shared decision making plan was used. The patient's values and choices were respected. Prior medical records from our last visit on 1/21/2020 and PT notes were reviewed today. The following represents what was discussed and decided upon by the provider and the patient.        -DIAGNOSTIC TESTS: Images were personally reviewed and interpreted.   --Xray lumbar spine dated 1/21/2020 is personally viewed images interpreted and shown to the patient.  There is interspace narrowing between L5-S1.  There is mild facet arthropathy and mild hypertrophic changes in the inferior portion of the SI joints left greater than right.  --X-ray of the right hip dated 1/21/2020 is personally viewed images interpreted and shown to the patient.  There is normal joint space and alignment with no fracture.  --Bone density scan dated 1/24/2020 report is reviewed and shows normal bone density.    -INTERVENTIONS: I have ordered a right greater trochanteric bursa injection under ultrasound guidance.    -MEDICATIONS: No changes to medications.  -  Discussed side effects of medications and proper use. Patient verbalized understanding.    -PHYSICAL THERAPY: The patient has had 4 sessions of  physical therapy.  I recommend that she continue with this.    Discussed the importance of core strengthening, ROM, stretching exercises with the patient and how each of these entities is important in decreasing pain.  Explained to the patient that the purpose of physical therapy is to teach the patient a home exercise program.  These exercises need to be performed every day in order to decrease pain and prevent future occurrences of pain.        -PATIENT EDUCATION: We discussed pain management in a multimodal fashion including physical therapy, location management, possible future injections.    -FOLLOW UP: The patient will follow-up 2 weeks after the injection.  Advised to contact clinic if symptoms worsen or change.    Subjective:     rFances Mane is a 49 y.o. female who presents today for follow-up regarding low back and right lateral thigh pain.  Patient reports that she continues to have the same pain despite 4 sessions of physical therapy.  Pain is worse with going up hills and stairs and laying on the right side at night.  The pain is more consistent in the right lateral thigh than the low back.  Pain today is 3/10 as worst is 10/10 is best a 0/10.  Currently she is not taking any medications.  She reports that she has had a greater trochanter bursa injection about 3 years ago which was helpful.  She denies any bowel or bladder changes, fevers, chills, unintentional weight loss.  Pain is achy in nature.    Evaluation to Date:Xray lumbar spine and right hip dated 1/21/2020.  Bone density scan dated 1/24/2020.    Treatment to Date: 3 sessions of physical therapy.      Patient Active Problem List   Diagnosis     Migraine Headache     Vestibular Neuronitis     Cervicalgia     Trochanteric Bursitis     Thyromegaly       Current Outpatient Medications on File Prior to Encounter   Medication Sig Dispense Refill     calcium carbonate-vitamin D2 500 mg(1,250mg) -200 unit tablet Take 1 tablet by mouth 2 (two) times  a day.       calcium polycarbophil (FIBERCON) 625 mg tablet Take 625 mg by mouth daily.       FLAXSEED OIL ORAL Take by mouth.       fluconazole (DIFLUCAN) 150 MG tablet TAKE 1 TABLET BY MOUTH ONCE FOR 1 DOSE. MAY REPEAT IN 24 HOURS AS NEEDED. DO NOT EXCEED 2 PER MONTH. 2 tablet 1     glucosamine-chondroitin 500-400 mg tablet        hydrocortisone 2.5 % cream Apply to inflamed skin 2-3 times daily until clear but not more than 2-3 weeks at a time       multivitamin therapeutic (THERAGRAN) tablet Take 1 tablet by mouth daily.       neomycin-polymyxin-dexamethamethasone (POLYDEX) 3.5 mg/g-10,000 unit/g-0.1 % Oint Apply to eyelids daily as needed for eczema 30 g 0     OMEGA-3/DHA/EPA/FISH OIL (FISH OIL-OMEGA-3 FATTY ACIDS) 300-1,000 mg capsule Take 2 g by mouth daily.       triamcinolone (KENALOG) 0.5 % cream Apply to skin lesions 3 times a day for 2 weeks, not on face 60 g 1     cyclobenzaprine (FLEXERIL) 10 MG tablet Take 1 tablet (10 mg total) by mouth 3 (three) times a day as needed. 60 tablet 3     naproxen (NAPROSYN) 500 MG tablet Take 1 tablet (500 mg total) by mouth 2 (two) times a day with meals. Take with food/water to prevent stomach upset. 60 tablet 1     SUMAtriptan (IMITREX) 50 MG tablet TAKE 1 - 2 TABLETS BY MOUTH EVERY 2 HOURS AS NEEDED. MAX OF 4 TABLETS PER 24 HOURS 12 tablet 3     No current facility-administered medications on file prior to encounter.        Allergies   Allergen Reactions     Cefadroxil      Erythromycin Base      rash, vomit       Latex      Morphine        Past Medical History:   Diagnosis Date     Ehrlichiosis Chaffeensis     Created by Conversion         Review of Systems  ROS:  Specifically negative for bowel/bladder dysfunction, balance changes, headache, dizziness, foot drop, fevers, chills, appetite changes, nausea/vomiting, unexplained weight loss. Otherwise 13 systems reviewed are negative. Please see the patient's intake questionnaire from today for details.    Reviewed  Social, Family, Past Medical and Past Surgical history with patient, no significant changes noted since prior visit.     Objective:     /62 (Patient Site: Right Arm, Patient Position: Sitting, Cuff Size: Adult Large)   Pulse 69   Temp 97.7  F (36.5  C) (Oral)   Resp 18   Wt 160 lb 1.6 oz (72.6 kg)   SpO2 100%   BMI 29.46 kg/m      PHYSICAL EXAMINATION:    --CONSTITUTIONAL: Well developed, well nourished, healthy appearing individual.  --PSYCHIATRIC: Appropriate mood and affect. No difficulty interacting due to temper, social withdrawal, or memory issues.  --SKIN: Lumbar region is dry and intact.   --RESPIRATORY: Normal rhythm and effort. No abnormal accessory muscle breathing patterns noted.   --MUSCULOSKELETAL:  Normal lumbar lordosis noted, no lateral shift.  --GROSS MOTOR: Easily arises from a seated position. Gait is non-antalgic  --LUMBAR SPINE:  Inspection reveals no evidence of deformity. Range of motion is not limited in lumbar flexion, extension, lateral rotation.  Tenderness palpation along the right greater trochanter area.  Straight leg raising in the seated position is negative to radicular pain. Sciatic notch non-tender.   --LOWER EXTREMITY MOTOR TESTING:  Plantar flexion left 5/5, right 5/5   Dorsiflexion left 5/5, right 5/5   Great toe MTP extension left 5/5, right 5/5  Knee flexion left 5/5, right 5/5  Knee extension left 5/5, right 5/5   Hip flexion left 5/5, right 5/5  Hip abduction left 5/5, right 5/5  Hip adduction left 5/5, right 5/5   --NEUROLOGIC:  Sensation to light touch is intact in the bilateral L4, L5, and S1 dermatomes.    RESULTS:   Imaging: Lumbar spine imaging was reviewed today. The images were shown to the patient and the findings were explained using a spine model.    Xr Lumbar Spine 2 Or 3 Vws    Result Date: 1/21/2020  EXAM: XR LUMBAR SPINE 2 OR 3 VWS LOCATION: [facility] DATE/TIME: 1/21/2020 9:08 AM INDICATION: Low back pain. COMPARISON: None.      Interspace narrowing L5-S1. Endplate osteophytes. Satisfactory height and alignment with 5 lumbar type vertebral bodies assumed. Minimal rightward lumbar curve may be positional. Mild hypertrophic changes inferior SI joints left more than right with vascular calcification noted. Otherwise negative. No acute process in the lumbar spine.    Xr Hip Right 2 Or More Vws    Result Date: 1/21/2020  EXAM: XR HIP RIGHT 2 OR MORE VWS LOCATION: [facility] DATE/TIME: 1/21/2020 9:08 AM INDICATION: Low back pain COMPARISON: None.     Normal joint spaces and alignment. No fracture.     Dxa Bone Density Scan    Result Date: 1/29/2020 1/24/2020 RE: Frances Mane YOB: 1970 Dear Anamaria Charlton, Patient Profile: 49 y.o. female, postmenopausal, is here for the first bone density test. History of fractures - Unknown. Family history of osteoporosis - Yes;  mother.  Family history of hip fracture: None. Smoking history - No. Osteoporosis treatment past -  No. Osteoporosis treatment current - No.  Chronic medical problems - Chronic low back problems. High risk medications -  None. Assessment: 1. The spine bone density L1-L4 with T-score 0.2. 2. Femoral bone densities show left femoral neck T- score -0.9 and right femoral neck T-score -0.9. 3. Trabecular bone score indicates good trabecular bone architecture. 49 y.o. female with NORMAL BONE DENSITY and LOW fracture risk, adjusted for the TBS, with major osteoporotic fracture risk 3.0 % and hip fracture risk 0.1 %. Recommendations: Appropriate calcium, vitamin D supplements, along with balance and weight bearing exercise recommended with follow up bone density scan in 5 years. Bone densitometry was performed on your patient using our [employer] densitometer. The results are summarized and a copy of the actual scans are included for your review. In conformity with the International Society of Clinical Densitometry's most recent position statement for DXA  interpretation (2015), the diagnosis will be made on the lowest measured T-score of the lumbar spine, femoral neck, total proximal femur or 33% radius. Note the change in terminology for diagnostic classification from OSTEOPENIA to LOW BONE MASS. All trending for sequential exams will be done using multiple vertebrae or the total proximal femur. Fracture risk is based on the WHO Fracture Risk Assessment Tool (FRAX). If additional information is needed or if you would like to discuss the results, please do not hesitate to call me. Thank you for referring this patient to [facility]. We are happy to be of service in support of you and your practice. If you have any questions or suggestions to improve our service, please call me at [phone]. Sincerely, NITZA BackCLINDA. Osteoporosis Services, [facility]    Us Thyroid    Result Date: 1/22/2020  EXAM: US THYROID LOCATION: [facility] DATE/TIME: 1/22/2020 1:18 PM INDICATION: Iodine-deficiency related diffuse (endemic) goiter COMPARISON: 11/17/2015. TECHNIQUE: Thyroid ultrasound. FINDINGS: RIGHT lobe: 4.07 x 1.6 cm. Benign spongiform 10 mm nodule of the interpolar region not clinically significant. No concerning nodule. Isthmus: 3.5 mm. LEFT lobe: 3.2 x 1.7 x 1.2 cm. Homogeneous echotexture. NECK: No pathologically enlarged nodes.     No significant abnormality D. Nodules are characterized per ACR Thyroid Imaging, Reporting and Data System (TI-RADS): White Paper of the ACR TI-RADS Committee Jonah Casillas et al. Journal of the American College of Radiology 2017. Volume 14 (2017), Issue 5, 587-753.

## 2021-06-06 NOTE — PROGRESS NOTES
[facility] Discharge Summary  Patient Name: Frances Mane  Date: 4/10/2020  Referral Diagnosis: Chronic bilateral low back pain without sciatica  Referring provider: Anamaria Charlton MD  Visit Diagnosis:     ICD-10-CM    1. Chronic midline low back pain without sciatica  M54.5     G89.29    2. Right hip pain  M25.551    3. Muscle weakness (generalized)  M62.81        Goals:  Pt. will demonstrate/verbalize independence in self-management of condition in : 6 weeks  Pt. will be independent with home exercise program in : 6 weeks  Pt. will have improved quality of sleep: with less pain;in 6 weeks;waking less times/night  Pt. will be able to walk : 30 minutes;60 minutes;on uneven/inclined surfaces;with less pain;with less difficulty;for community mobility;for exercise/recreation;in 6 weeks  Patient will ascend / descend: stairs;step;with less pain;with less difficulty;in 6 weeks    No data recorded    Patient was seen for 4 visits from 1/21/2020 to 2/20/2020 with 2 missed appointments.  The patient discontinued therapy, did not return.  No further therapy is required at this time.  Luzma cancelled her last appointment scheduled and did not return to clinic. per chart review she had follow up with the MD and then had injections done as well, at this time she will be discharged from therapy and will need new orders to return if recommended.     Home exercise program given to patient:  - reach and roll   - clam shells L2 band   - seated fig 4 stretching    - standing ITB stretching   - Bridging with band across hips  - hook-lying hip abduction L2 band   - SL hip abduction   - prone hip extension   - static lunges  - squats     Therapy will be discontinued at this time.  The patient will need a new referral to resume.    Thank you for your referral.  Soco Winter, PT, DPT  4/10/2020 8:43 AM      [facility] Daily Progress     Patient Name: Frances Mane  Date: 2/20/2020  Visit #:  4  PTA visit #:    Date of evaluation: 1/21/2020  Referral Diagnosis: Chronic bilateral low back pain without sciatica  Referring provider: Fab Harris*  Visit Diagnosis:     ICD-10-CM    1. Chronic midline low back pain without sciatica M54.5     G89.29    2. Right hip pain M25.551    3. Muscle weakness (generalized) M62.81      Initial Assessment    Frances Mane is a 49 y.o. female who presents to therapy today with chief complaints of low back pain with right hip paint that started in 2013. She had an injection in the hip in 2013 nd it was better after that time but always still there until she went hiking in October and the pain increased after that walk. Patient present with tenderness throughout the right hip in the greater trochanter, ITB, TFL and QL. She as decreased ROM in the lumbar spine in flexion and extension. She has some weakness and pain with MMT noted on the right side. Patient will benefit from skilled therapy to increase ROM, increase strength and flexibility in order to decrease pain and inflammation and improve mobility and return to prior level of function.        Assessment:      HEP/POC compliance is  good .  Patient demonstrates understanding/independence with home program.  Patient is benefitting from skilled physical therapy and is making steady progress toward functional goals.  Patient is appropriate to continue with skilled physical therapy intervention, as indicated by initial plan of care.    Goal Status:  Pt. will demonstrate/verbalize independence in self-management of condition in : 6 weeks  Pt. will be independent with home exercise program in : 6 weeks  Pt. will have improved quality of sleep: with less pain;in 6 weeks;waking less times/night  Pt. will be able to walk : 30 minutes;60 minutes;on uneven/inclined surfaces;with less pain;with less difficulty;for community mobility;for exercise/recreation;in 6 weeks  Patient will ascend / descend: stairs;step;with less  pain;with less difficulty;in 6 weeks    No data recorded    Plan / Patient Education:     Continue with initial plan of care.  Progress with home program as tolerated.    Subjective:     Pain Rating: sore   Feels there is really no change in her overall pain of the hip since starting therapy. The pain is better the day after therapy and then goes back to its original state.   Was on vacation, walking the beach some was no problem.   Has been exercising consistently no pain with them.     Objective:     Palpation: tenderness to the greater trochanter and the distal ITB on the right side  Lumbar Flexion to ankles, no pain  Lumbar extension WNL mild pain central low back     HEP   - reach and roll   - clam shells L2 band   - seated fig 4 stretching    - standing ITB stretching   - Bridging with band across hips  - hook-lying hip abduction L2 band   - SL hip abduction   - prone hip extension   - static lunges  - squats       Treatment Today   2/20/2020  TREATMENT MINUTES COMMENTS   Evaluation     Self-care/ Home management     Manual therapy 20 SL lumbar rotational mobs grade II-III  AP mobs to pelvic rotational mobs   STM to paraspinals, QL and ITB in side-lying position    Neuromuscular Re-education     Therapeutic Activity     Therapeutic Exercises 10 Bike WL 3 x 5 min   Reviewed stretching exercises as above     perfomred in clinic  - SL hip abduction x 12 reps   - prone hip extension 2 x 10 reps with cues for glute contraction   - static lunges x 10 reps B    Gait training     Modality__________________                Total 30    Blank areas are intentional and mean the treatment did not include these items.     Soco Winter, PT, DPT, CLT   2/20/2020

## 2021-06-06 NOTE — PATIENT INSTRUCTIONS - HE
DISCHARGE INSTRUCTIONS    During office hours (8:00 a.m.- 4:00 p.m.) questions or concerns may be answered  by calling [facility] Nurses at  [phone].  Messages received after hours will be returned the following business day.      In the case of an emergency, please dial 911 or seek assistance at the nearest Emergency Room/Urgent Care facility.     All Patients:    ? You may experience an increase in your symptoms for the first 2 days (It may take anywhere between 2 days- 2 weeks for the steroid to have maximum effect).    ? You may use ice on the injection site, as frequently as 20 minutes each hour if needed.    ? You may take your pain medicine.    ? You may continue taking your regular medication after your injection.    ? You may shower. No swimming, tub bath or hot tub for 48 hours.  You may remove your bandaid/bandage as soon as you are home.    ? You may resume light activities, as tolerated.    ? Resume your usual diet as tolerated.      POSSIBLE STEROID SIDE EFFECTS (If steroid/cortisone was used for your procedure)    -If you experience these symptoms, it should only last for a short period      Swelling of the legs                Skin redness (flushing)       Mouth (oral) irritation     Blood sugar (glucose) levels              Sweats                      Mood changes    Headache    Sleeplessness         POSSIBLE PROCEDURE SIDE EFFECTS  -Call the [facility] if you are concerned    Increased Pain             Increased numbness/tingling        Nausea/Vomiting            Bruising/bleeding at site        Redness or swelling                                                Difficulty walking        Weakness             Fever greater than 100.5

## 2021-06-11 NOTE — TELEPHONE ENCOUNTER
RN cannot approve Refill Request    RN can NOT refill this medication med is not covered by policy/route to provider. Last office visit: 7/17/2019 Anamaria Charlton MD Last Physical: 1/16/2020 Last MTM visit: Visit date not found Last visit same specialty: 7/17/2019 Anamaria Charlton MD.  Next visit within 3 mo: Visit date not found  Next physical within 3 mo: Visit date not found      Mariely Schneider, [facility] Triage/Med Refill 9/18/2020    Requested Prescriptions   Pending Prescriptions Disp Refills     SUMAtriptan (IMITREX) 50 MG tablet 12 tablet 3     Sig: TAKE 1 - 2 TABLETS BY MOUTH EVERY 2 HOURS AS NEEDED. MAX OF 4 TABLETS PER 24 HOURS       There is no refill protocol information for this order

## 2021-06-14 ENCOUNTER — HOSPITAL ENCOUNTER (OUTPATIENT)
Dept: MAMMOGRAPHY | Facility: CLINIC | Age: 51
Discharge: HOME | End: 2021-06-14
Attending: FAMILY MEDICINE
Payer: COMMERCIAL

## 2021-06-14 DIAGNOSIS — Z12.31 VISIT FOR SCREENING MAMMOGRAM: ICD-10-CM

## 2021-06-14 NOTE — TELEPHONE ENCOUNTER
Last Office Visit:  01/16/20  Last Refill:  04/10/19  Disp Refills Start End PHILIP    cyclobenzaprine (FLEXERIL) 10 MG tablet 60 tablet 3 4/10/2019  No   Sig - Route: Take 1 tablet (10 mg total) by mouth 3 (three) times a day as needed. - Oral       CSA:  N/A  Date of Last Labs:  01/16/20

## 2021-06-16 PROBLEM — A60.00 GENITAL HERPES SIMPLEX TYPE 1 INFECTION: Status: ACTIVE | Noted: 2021-05-24

## 2021-06-16 PROBLEM — N95.2 ATROPHIC VAGINITIS: Status: ACTIVE | Noted: 2021-03-22

## 2021-06-16 NOTE — PATIENT INSTRUCTIONS - HE
Dermatology consult for full body skin check and to assess the lumps on your anterior lower legs.    If the lumps on the anterior lower legs are painful or increasing in size then I would want to visit with the surgeon.    Steroid injection performed today of the right greater trochanteric bursa.  No heavy exertion for a week.  You can use some ice or heat as needed.  Could have a steroid flare was a little more painful over the next day or 2.    No more thyroid ultrasounds needed unless compressive symptoms.    For the vaginal dryness can use a water-based lubricant like K-Y jelly.  If you wish a prescription for vaginal estrogen let me know.    For right elbow pain start with icing.  Can buy an elbow strap over-the-counter if you need wearing it during the daytime off at bedtime.  If you need a referral to physical therapy let me know.      Health Maintenance   Topic Date Due     HEPATITIS C SCREENING  NA     HIV SCREENING  NA     PREVENTIVE CARE VISIT  Today     MAMMOGRAM  06/11/2021, ordered     TD 18+ HE  09/20/2022     COLORECTAL CANCER SCREENING  02/11/2023     PAP SMEAR  2023     LIPID  Today     HPV TEST  2023     ADVANCE CARE PLANNING  Packet given     INFLUENZA VACCINE RULE BASED  Completed     TDAP ADULT ONE TIME DOSE  Completed     ZOSTER VACCINES  Completed     COVID-19 Vaccine  Completed     Pneumococcal Vaccine: Pediatrics (0 to 5 Years) and At-Risk Patients (6 to 64 Years)  NA     HEPATITIS B VACCINES  Completed     Dexa scan:  Due 2025
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
## 2021-06-16 NOTE — PROGRESS NOTES
Assessment:     Diagnoses and all orders for this visit:    Greater trochanteric bursitis of right hip  -     OPS US Large Joint Injection Unilateral; Future; Expected date: 04/29/2021    Spondylosis of lumbar region without myelopathy or radiculopathy  -     OPS US Large Joint Injection Unilateral; Future; Expected date: 04/29/2021    Myofascial pain  -     OPS US Large Joint Injection Unilateral; Future; Expected date: 04/29/2021       Frances Mane is a 50 y.o. y.o. female with past medical history significant for migraine headache, vestibular neuronitis, cervicalgia, trochanteric bursitis, thyromegaly  who presents today for follow-up regarding low back and right lateral thigh pain:    -Overall patient's physical exam is reassuring that she has normal strength in her lower extremities.  Her pain is likely secondary to regular joint or bursitis.  She also does have positive facet loading, however low back pain is not her primary source of pain today.  Her back pain is likely secondary to lumbar spondylosis without myelopathy.     Plan:     A shared decision making plan was used. The patient's values and choices were respected. Prior medical records from our last visit on 2/21/2020 were reviewed today. The following represents what was discussed and decided upon by the provider and the patient.        -DIAGNOSTIC TESTS: Images were personally reviewed and interpreted.   --Xray lumbar spine dated 1/21/2020 is personally viewed images interpreted and shown to the patient.  There is interspace narrowing between L5-S1.  There is mild facet arthropathy and mild hypertrophic changes in the inferior portion of the SI joints left greater than right.  --X-ray of the right hip dated 1/21/2020 is personally viewed images interpreted and shown to the patient.  There is normal joint space and alignment with no fracture.  --Bone density scan dated 1/24/2020 report is reviewed and shows normal bone density.    -INTERVENTIONS:  Ordered right ultrasound-guided greater trochanter bursa injection.    -MEDICATIONS: No changes to medications.  -  Discussed side effects of medications and proper use. Patient verbalized understanding.    -PHYSICAL THERAPY: The patient has had 4 sessions of physical therapy in the past.  She is continued with these exercises I recommended she continue with this.      Discussed the importance of core strengthening, ROM, stretching exercises with the patient and how each of these entities is important in decreasing pain.  Explained to the patient that the purpose of physical therapy is to teach the patient a home exercise program.  These exercises need to be performed every day in order to decrease pain and prevent future occurrences of pain.        -PATIENT EDUCATION: We discussed pain management in a multimodal fashion including physical therapy, medication management, possible future injections.    -FOLLOW UP: Patient will follow up as needed.  Advised to contact clinic if symptoms worsen or change.    Subjective:     Frances Mane is a 50 y.o. female who presents today for follow-up regarding right lateral thigh pain.  Patient reports that she has had worsening of right lateral thigh pain.  It used to be only bad when she would walk up hills or lay on the right side now it is worse with any walking of distance.  Pain is improved somewhat with rest and ice as well as Aleve.  Pain today is 3/10 is worst 10/10 is best is 0/10.  She notes that she has been limping more when she is on flat ground.  She has been doing her physical therapy exercises and is to be somewhat helpful.  She did have a landmark based peritrochanteric bursa injection on 3/22/2020 with 0 relief.  Before that and February 2020 I have done the camera guided greater trochanter bursa injection for which she received at least 6 months of near 100% relief.  She is wondering if trying it under ultrasound again with the helpful.  She denies any bowel  or bladder changes, fevers, chills, unintentional weight loss.    Evaluation to Date:Xray lumbar spine and right hip dated 1/21/2020.  Bone density scan dated 1/24/2020.     Treatment to Date: 4 sessions of physical therapy.    Right greater trochanter bursa injection under ultrasound guidance on 2/20/2020.  Regular trochanter bursa landmark injection on 3/22/2021.       Patient Active Problem List   Diagnosis     Migraine Headache     Vestibular Neuronitis     Cervicalgia     Trochanteric Bursitis     Thyromegaly     Atrophic vaginitis       Current Outpatient Medications on File Prior to Encounter   Medication Sig Dispense Refill     calcium carbonate-vitamin D2 500 mg(1,250mg) -200 unit tablet Take 1 tablet by mouth 2 (two) times a day.       calcium polycarbophil (FIBERCON) 625 mg tablet Take 625 mg by mouth daily.       cyclobenzaprine (FLEXERIL) 10 MG tablet Take 1 tablet (10 mg total) by mouth 3 (three) times a day as needed. 60 tablet 3     FLAXSEED OIL ORAL Take by mouth.       fluconazole (DIFLUCAN) 150 MG tablet TAKE 1 TABLET BY MOUTH ONCE FOR 1 DOSE. MAY REPEAT IN 24 HOURS AS NEEDED. DO NOT EXCEED 2 PER MONTH. 2 tablet 1     hydrocortisone 2.5 % cream Apply to inflamed skin 2-3 times daily until clear but not more than 2-3 weeks at a time       multivitamin therapeutic (THERAGRAN) tablet Take 1 tablet by mouth daily.       naproxen sodium (ALEVE) 220 MG tablet Take 220 mg by mouth as needed for pain.       neomycin-polymyxin-dexamethamethasone (POLYDEX) 3.5 mg/g-10,000 unit/g-0.1 % Oint Apply to eyelids daily as needed for eczema 30 g 0     OMEGA-3/DHA/EPA/FISH OIL (FISH OIL-OMEGA-3 FATTY ACIDS) 300-1,000 mg capsule Take 2 g by mouth daily.       SUMAtriptan (IMITREX) 50 MG tablet TAKE 1 - 2 TABLETS BY MOUTH EVERY 2 HOURS AS NEEDED. MAX OF 4 TABLETS PER 24 HOURS 12 tablet 3     triamcinolone (KENALOG) 0.5 % cream Apply to skin lesions 3 times a day for 2 weeks, not on face 60 g 1     [DISCONTINUED]  naproxen (NAPROSYN) 500 MG tablet Take 1 tablet (500 mg total) by mouth 2 (two) times a day with meals. Take with food/water to prevent stomach upset. 60 tablet 1     No current facility-administered medications on file prior to encounter.        Allergies   Allergen Reactions     Cefadroxil      Erythromycin Base      rash, vomit       Latex      Morphine        Past Medical History:   Diagnosis Date     Ehrlichiosis Chaffeensis     Created by Conversion         Review of Systems  ROS:   Specifically negative for bowel/bladder dysfunction, balance changes, headache, dizziness, foot drop, fevers, chills, appetite changes, nausea/vomiting, unexplained weight loss. Otherwise 13 systems reviewed are negative. Please see the patient's intake questionnaire from today for details.    Reviewed Social, Family, Past Medical and Past Surgical history with patient, no significant changes noted since prior visit.     Objective:     /72 (Patient Site: Right Arm, Patient Position: Sitting, Cuff Size: Adult Regular)   Pulse 79   Temp 98.2  F (36.8  C) (Oral)   LMP 08/07/2018     PHYSICAL EXAMINATION:    --CONSTITUTIONAL: Well developed, well nourished, healthy appearing individual.  --PSYCHIATRIC: Appropriate mood and affect. No difficulty interacting due to temper, social withdrawal, or memory issues.  --SKIN: Lumbar region is dry and intact.   --RESPIRATORY: Normal rhythm and effort. No abnormal accessory muscle breathing patterns noted.   --MUSCULOSKELETAL:  Normal lumbar lordosis noted, no lateral shift.  --GROSS MOTOR: Easily arises from a seated position. Gait is non-antalgic  --LUMBAR SPINE:  Inspection reveals no evidence of deformity. Range of motion is mildly limited in lumbar flexion, extension, lateral rotation.  Mild tenderness palpation along the low back.  Tenderness palpation over the right greater trochanter area.  Straight leg raising in the seated position is negative to radicular pain.  Facet loading  is positive bilaterally.   --SACROILIAC JOINT: One Finger point test negative.  --LOWER EXTREMITY MOTOR TESTING:  Plantar flexion left 5/5, right 5/5   Dorsiflexion left 5/5, right 5/5   Great toe MTP extension left 5/5, right 5/5  Knee flexion left 5/5, right 5/5  Knee extension left 5/5, right 5/5   Hip flexion left 5/5, right 5/5  Hip abduction left 5/5, right 5/5  Hip adduction left 5/5, right 5/5   --NEUROLOGIC: Bilateral patellar and achilles reflexes are physiologic and symmetric. Sensation to light touch is intact in the bilateral L4, L5, and S1 dermatomes.    RESULTS:   Imaging: Lumbar spine imaging was reviewed today.

## 2021-06-16 NOTE — PATIENT INSTRUCTIONS - HE
Recommend you continue with your physical therapy exercises on a daily basis.    I have ordered a right greater trochanter bursa injection for you.    ~Please call Nurse Navigation line [phone] with any questions or concerns about your treatment plan, if symptoms worsen and you would like to be seen urgently, or if you have problems controlling bladder and bowel function.

## 2021-06-17 NOTE — PROGRESS NOTES
"    Assessment & Plan     Frances was seen today for groin swelling.    Diagnoses and all orders for this visit:    Dysuria  -     Urinalysis-UC if Indicated  -     Culture, Urine  -     Culture, Urine    Acute vaginitis  -     Wet Prep, Vaginal    Acute vulvitis  -     Culture, Genital  -     Herpes Simplex Virus 1&2 by PCR Swab    BV (bacterial vaginosis)  -     metroNIDAZOLE (METROGEL VAGINAL) 0.75 % vaginal gel; One applicator at bedtime for 5 nights    Today collected urinalysis, swab for infection such as bacterial vaginosis yeast and trichomonas, a general genital bacterial swab and herpes swab.    Urine did have a trace of blood as well as trace of ketones and leukocytes.  We will send out for culture.    If after we have treated for a vaginal infection you see any blood coming from the vagina or blood in the urine each of those should be looked into further.    Wet prep did show bacterial vaginosis.  Treating with MetroGel vaginal and a small amount to the vulvar area at bedtime for 5 nights.          20 minutes spent on the date of the encounter doing chart review, history and exam, documentation and further activities per the note       BMI:   Estimated body mass index is 26.86 kg/m  as calculated from the following:    Height as of 3/22/21: 5' 1.5\" (1.562 m).    Weight as of this encounter: 144 lb 8 oz (65.5 kg).           No follow-ups on file.    Anamaria Charlton MD  [facility]    Subjective   Frances Mane is 50 y.o. and presents today for the following health issues   HPI   Chief Complaint   Patient presents with     Groin Swelling     vaginal swelling, redness and burnign x 1 week- took old Diflucan rx last weekend- no relief      Vaginitis:  Lower vaginal burning when urine hits it.  1 1/2 weeks ago, possible soap in vagina. Fluconazole helped a little.  Some watery discharge.  When she has had yeast infections has not always had cottage cheese discharge.  Does have " itching.  Using OTC vagisil. No odor, but some yellow discharge.  Feels very swollen on left labia.  Looks red.  No blister.  No concern about an STD.  She possibly had some blood from her vagina or possibly from the urethra.  She has noticed some blood staining on her pad.    Dysuria:  Chills 3 nights ago.  No flank pain.  No fever.                Objective    /72 (Patient Site: Left Arm, Patient Position: Sitting, Cuff Size: Adult Regular)   Pulse 81   Temp 98.7  F (37.1  C) (Oral)   Resp 16   Wt 144 lb 8 oz (65.5 kg)   LMP 08/07/2018   BMI 26.86 kg/m    Body mass index is 26.86 kg/m .  Physical Exam  Gen: Some discomfort secondary to vulvar irritation  Genitourinary: The introitus and left labia/perineum is erythematous and edematous and tender to touch, the vaginal mucosa is red and the cervix does appear a bit irritated and friable, clear vaginal discharge

## 2021-06-17 NOTE — PATIENT INSTRUCTIONS - HE
DISCHARGE INSTRUCTIONS    During office hours (8:00 a.m.- 4:00 p.m.) questions or concerns may be answered  by calling [facility] Center Navigation Nurses at  [phone].  Messages received after hours will be returned the following business day.      In the case of an emergency, please dial 911 or seek assistance at the nearest Emergency Room/Urgent Care facility.     All Patients:    ? You may experience an increase in your symptoms for the first 2 days (It may take anywhere between 2 days- 2 weeks for the steroid to have maximum effect).    ? You may use ice on the injection site, as frequently as 20 minutes each hour if needed.    ? You may take your pain medicine.    ? You may continue taking your regular medication after your injection. If you have had a Medial Branch Block you may resume pain medication once your pain diary is completed.    ? You may shower. No swimming, tub bath or hot tub for 48 hours.  You may remove your bandaid/bandage as soon as you are home.    ? You may resume light activities, as tolerated.    ? Resume your usual diet as tolerated.    ? It is strongly advised that you do not drive for 1-3 hours post injection.    ? If you have had oral sedation:  Do not drive for 8 hours post injection.      ? If you have had IV sedation:  Do not drive for 24 hours post injection.  Do not operate hazardous machinery or make important personal/business decisions for 24 hours.      POSSIBLE STEROID SIDE EFFECTS (If steroid/cortisone was used for your procedure)    -If you experience these symptoms, it should only last for a short period      Swelling of the legs                Skin redness (flushing)       Mouth (oral) irritation     Blood sugar (glucose) levels              Sweats                      Mood changes    Headache    Sleeplessness    Weakened immune system for up to 14 days, which could increase the risk of erik the COVID-19 virus and/or experiencing more severe symptoms of the  disease, if exposed.    Decreased effectiveness of the flu vaccine if given within 2 weeks of the steroid.         POSSIBLE PROCEDURE SIDE EFFECTS  -Call the Spine Center if you are concerned    Increased Pain             Increased numbness/tingling        Nausea/Vomiting            Bruising/bleeding at site        Redness or swelling                                                Difficulty walking        Weakness             Fever greater than 100.5    *In the event of a severe headache after an epidural steroid injection that is relieved by lying down, please call the [facility] to speak with a clinical staff member*

## 2021-06-17 NOTE — PATIENT INSTRUCTIONS - HE
Patient Instructions by Soco Winter PT at 1/21/2020  3:30 PM     Author: Soco Winter PT Service: -- Author Type: Physical Therapist    Filed: 1/21/2020  4:34 PM Encounter Date: 1/21/2020 Status: Signed    : Soco Winter PT (Physical Therapist)        SIDELYING TRUNK ROTATION    While lying on your side with your arms out-stretched in front of your body, slowly twist your upper body to the side and rotated your spine. Your arms and head should also be rotating along with the spine as shown. Follow your hand with your eyes.    Do 10 reps each way       CLAM SHELLS    While lying on your side with your knees bent, draw up the top knee while keeping contact of your feet together.    Do not let your pelvis roll back during the lifting movement.      Do to fatigue on each side        Seated figure four    Sit on the edge of a chair/bench/bed. Place one ankle over the opposite knee. For greater stretch in the buttock area gently push the knee of the crossed leg down away from you.   Hold 30 sec and do 3 times   2-3 times per day     STANDING ILIOTIBIAL BAND STRETCH SUPPORTED - ITB    In a standing position, cross the affected leg behind your unaffected leg.     Next, lean forward and towards the unaffected side while using your arm for balance support.    Hold 30 sec and do 2-3  1-2 times per day      Foam roller on the side of the leg is tolerated well.

## 2021-06-17 NOTE — PATIENT INSTRUCTIONS - HE
Today collected urinalysis, swab for infection such as bacterial vaginosis yeast and trichomonas, a general genital bacterial swab and herpes swab.    Urine did have a trace of blood as well as trace of ketones and leukocytes.  We will send out for culture.    If after we have treated for a vaginal infection you see any blood coming from the vagina or blood in the urine each of those should be looked into further.    Wet prep did show bacterial vaginosis.  Treating with MetroGel vaginal and a small amount to the vulvar area at bedtime for 5 nights.

## 2021-06-17 NOTE — PROGRESS NOTES
Assessment/Plan:        1. Ankle sprain  Exam findings were discussed    Plan:   Supportive care with cryotherapy, and analgesics prn  - Ankle brace       Close follow up with any worsening or no significant improvement as anticipated.       Subjective:    Patient ID:   Frances Mane is a 47 y.o. female here with left ankle pain due to a slip injury going down the stairs yesterday.  She has been managing the pain with icing it and been able to bear weight.   No other concerns.     allergies, current medications, past medical history and problem list.    Review of Systems  A complete 3 point review of systems was obtained and is negative other than what is stated in the HPI.           Objective:   /64 (Patient Site: Right Arm, Patient Position: Sitting, Cuff Size: Adult Regular)  Pulse 74  Temp 98.1  F (36.7  C) (Oral)   Wt 159 lb 12.8 oz (72.5 kg)  SpO2 99%  BMI 29.23 kg/m2      Physical Exam  General: no distress, and well hydrated  Left ankle: nl to inspection, no swelling, diffuse mild tenderness to the joint line, and to the lateral malleolus. No midfoot or base of the fifth tenderness.

## 2021-06-18 NOTE — PATIENT INSTRUCTIONS - HE
"Patient Instructions by Soco Winter PT at 1/28/2020  8:00 AM     Author: Soco Winter PT Service: -- Author Type: Physical Therapist    Filed: 1/28/2020  8:42 AM Encounter Date: 1/28/2020 Status: Signed    : Soco Winter PT (Physical Therapist)        BRIDGING    While lying on your back, tighten your lower abdominals, squeeze your buttocks and then raise your buttocks off the floor/bed as creating a \"Bridge\" with your body.    Tie band around the knees and push out into the band   Hold 10 sec and do 15-20 reps         SUPINE HIP ABDUCTION - ELASTIC BAND CLAMS    Lie down on your back with your knees bent. Place an elastic band around your knees tighten your abdominal muscles and and then draw your knees apart.    Do 15-20 reps               "

## 2021-06-20 NOTE — LETTER
Letter by Anamaria Charlton MD at      Author: Anamaria Charlton MD Service: -- Author Type: --    Filed:  Encounter Date: 2/26/2020 Status: (Other)         Frances Mane  [address] [facility] [address]             February 26, 2020         Dear MsSusana Alhaji,    Below are the results from your recent visit:    Negative Cologuard colon cancer screening test.  Repeat in 3 years.    Please call with questions or contact us using [employer].    Sincerely,        Electronically signed by Anamaria Charlton MD

## 2021-06-20 NOTE — LETTER
Letter by Rachel Valentin at      Author: Rachel Valentin Service: -- Author Type: --    Filed:  Encounter Date: 9/29/2020 Status: (Other)         September 29, 2020       Frances Mane  [address] [place] [address]    Dear Frances Mane:    We are pleased to provide you with secure, online access to medical information for you and your family within [facility] [employer]. Per your request, we have expanded your account to allow access to the records of the following family members:              Jazmín Mane (privilege ends on 11/26/2021.)            Abraham Mane (privilege ends on 9/29/2025.)     How Do I Log In?  1. In your Internet browser, go to [email or web address]  2. Log into [employer] using your [employer] Username and Password.  3. Click Sign In.        How Do I Access a Family Member's Account?  4. Select the account you want to access by clicking the [place] with the appropriate patient's name at the top of your screen.   5. You will see a disclaimer page letting you know that you will be viewing a family member's record. Review the disclaimer and then click Accept Proxy Access Disclaimer to proceed.  6. Once you switch to viewing a family member's record, you can navigate to [employer] pages the same way you would for yourself. You can return to your own account by clicking the [place] at the top of the screen with your name on it.    7. To customize the colors and names of the linked accounts, you can select Personalize from the Profile dropdown menu at the top of the screen, then click the Edit button to make changes.     Additional Information  If you have questions, visit [email or web address], e-mail [email or web address] or call [phone] to talk to our [employer] staff. Remember, [employer] is NOT to be used for urgent needs. For medical emergencies, dial 911.

## 2021-07-03 NOTE — ADDENDUM NOTE
Addendum Note by Nelly Paz CMA at 3/28/2019  1:43 PM     Author: Nelly Paz CMA Service: -- Author Type: Certified Medical Assistant    Filed: 3/28/2019  1:43 PM Encounter Date: 3/28/2019 Status: Signed    : Nelly Paz CMA (Certified Medical Assistant)    Addended by: NELLY PAZ on: 3/28/2019 01:43 PM        Modules accepted: Orders

## 2021-07-21 ENCOUNTER — RECORDS - HEALTHEAST (OUTPATIENT)
Dept: ADMINISTRATIVE | Facility: CLINIC | Age: 51
End: 2021-07-21

## 2021-12-30 DIAGNOSIS — Z79.899 MEDICATION MANAGEMENT: ICD-10-CM

## 2022-01-01 RX ORDER — SUMATRIPTAN 50 MG/1
TABLET, FILM COATED ORAL
Qty: 12 TABLET | Refills: 3 | Status: SHIPPED | OUTPATIENT
Start: 2022-01-01 | End: 2022-07-08

## 2022-01-18 VITALS
SYSTOLIC BLOOD PRESSURE: 128 MMHG | WEIGHT: 144.5 LBS | DIASTOLIC BLOOD PRESSURE: 72 MMHG | HEART RATE: 81 BPM | BODY MASS INDEX: 26.86 KG/M2 | TEMPERATURE: 98.7 F | RESPIRATION RATE: 16 BRPM

## 2022-01-18 VITALS
WEIGHT: 144.5 LBS | BODY MASS INDEX: 26.59 KG/M2 | RESPIRATION RATE: 16 BRPM | HEIGHT: 62 IN | TEMPERATURE: 97.6 F | DIASTOLIC BLOOD PRESSURE: 67 MMHG | HEART RATE: 71 BPM | SYSTOLIC BLOOD PRESSURE: 117 MMHG

## 2022-01-18 VITALS
BODY MASS INDEX: 30.02 KG/M2 | HEIGHT: 62 IN | WEIGHT: 163.13 LBS | RESPIRATION RATE: 16 BRPM | HEART RATE: 76 BPM | DIASTOLIC BLOOD PRESSURE: 81 MMHG | SYSTOLIC BLOOD PRESSURE: 123 MMHG

## 2022-04-14 ENCOUNTER — OFFICE VISIT (OUTPATIENT)
Dept: PHYSICAL MEDICINE AND REHAB | Facility: CLINIC | Age: 52
End: 2022-04-14
Payer: COMMERCIAL

## 2022-04-14 VITALS — DIASTOLIC BLOOD PRESSURE: 64 MMHG | SYSTOLIC BLOOD PRESSURE: 120 MMHG | HEART RATE: 72 BPM | OXYGEN SATURATION: 98 %

## 2022-04-14 DIAGNOSIS — M79.18 MYOFASCIAL PAIN: ICD-10-CM

## 2022-04-14 DIAGNOSIS — M70.61 GREATER TROCHANTERIC BURSITIS OF RIGHT HIP: Primary | ICD-10-CM

## 2022-04-14 DIAGNOSIS — M47.816 SPONDYLOSIS OF LUMBAR REGION WITHOUT MYELOPATHY OR RADICULOPATHY: ICD-10-CM

## 2022-04-14 PROCEDURE — 99214 OFFICE O/P EST MOD 30 MIN: CPT | Performed by: PAIN MEDICINE

## 2022-04-14 ASSESSMENT — PAIN SCALES - GENERAL: PAINLEVEL: MODERATE PAIN (4)

## 2022-04-14 NOTE — PATIENT INSTRUCTIONS
I have ordered an injection for you.  You may follow-up as needed after the injection    ~Please call Nurse Navigation line [phone] with any questions or concerns about your treatment plan, if symptoms worsen and you would like to be seen urgently, or if you have problems controlling bladder and bowel function.

## 2022-04-14 NOTE — LETTER
4/14/2022         RE: Frances Mane  [address] [place] [address]        Dear Colleague,    Thank you for referring your patient, Frances Mane, to the [facility]. Please see a copy of my visit note below.      Assessment:     Diagnoses and all orders for this visit:  Greater trochanteric bursitis of right hip  -     PAIN US Large Joint Injection Unilateral; Future  Spondylosis of lumbar region without myelopathy or radiculopathy  Myofascial pain     Frances Mane is a 51 year old y.o. female with past medical history significant for migraine headache, vestibular neuronitis, cervicalgia, trochanteric bursitis, thyromegaly  who presents today for follow-up regarding right lateral thigh pain:    -Patient returns 1 year after greater trochanter bursa injection with ultrasound guidance.  She notes near 100% relief for 10 months.  Over the last 2 months pain has worsened.     Plan:     A shared decision making plan was used. The patient's values and choices were respected. Prior medical records from our last visit on 4/22/2021 were reviewed today. The following represents what was discussed and decided upon by the provider and the patient.        -DIAGNOSTIC TESTS: Images were personally reviewed and interpreted.   --Xray lumbar spine dated 1/21/2020 is personally viewed images interpreted and shown to the patient.  There is interspace narrowing between L5-S1.  There is mild facet arthropathy and mild hypertrophic changes in the inferior portion of the SI joints left greater than right.  --X-ray of the right hip dated 1/21/2020 is personally viewed images interpreted and shown to the patient.  There is normal joint space and alignment with no fracture.  --Bone density scan dated 1/24/2020 report is reviewed and shows normal bone density.    -INTERVENTIONS: I ordered a right greater trochanter bursa injection under ultrasound guidance.    -MEDICATIONS: No changes to  medications.  -  Discussed side effects of medications and proper use. Patient verbalized understanding.    -PHYSICAL THERAPY: Recommend that she continue with home exercises on a continuous basis.  Discussed the importance of core strengthening, ROM, stretching exercises with the patient and how each of these entities is important in decreasing pain.  Explained to the patient that the purpose of physical therapy is to teach the patient a home exercise program.  These exercises need to be performed every day in order to decrease pain and prevent future occurrences of pain.        -PATIENT EDUCATION: We discussed pain management in a multimodal fashion including physical therapy, medication management, possible future injections.    -FOLLOW UP: Patient will follow up as needed after injections.  Advised to contact clinic if symptoms worsen or change.    Subjective:     Frances Mane is a 51 year old female who presents today for follow-up regarding right lateral thigh pain.  Patient notes near 100% relief for 10 months with her last right greater trochanter bursa injection.  In the last 2 months pain has been worsening progressively.  Her pain today is 4/10 is worst is 10/10 is best is 1/10.  Pain is worse with walking up a hill and stairs and improved with ice, heat, rest and Aleve.  She also takes Tylenol which is somewhat helpful.  Her pain today is 4/10 is worst is 10/10 is best is 1/10.  She notes that she has had greater trochanter bursa injections without ultrasound guidance with minimal to no relief and would like another one with ultrasound.  She denies any bowel or bladder changes, fevers, chills, unintentional weight loss.    Evaluation to Date:Xray lumbar spine and right hip dated 1/21/2020.  Bone density scan dated 1/24/2020.     Treatment to Date: 4 sessions of physical therapy.    Right greater trochanter bursa injection under ultrasound guidance on 2/20/2020.  Regular trochanter bursa landmark  injection on 3/22/2021.  Right greater trochanter bursa injection on 4/29/2021.    Patient Active Problem List   Diagnosis     Migraine Headache     Vestibular Neuronitis     Cervicalgia     Trochanteric Bursitis     Thyromegaly     Atrophic vaginitis     Genital herpes simplex type 1 infection       Current Outpatient Medications   Medication     calcium carbonate-vitamin D2 500 mg(1,250mg) -200 unit tablet     calcium polycarbophil (FIBERCON) 625 mg tablet     cyclobenzaprine (FLEXERIL) 10 MG tablet     FLAXSEED OIL ORAL     hydrocortisone 2.5 % cream     multivitamin therapeutic (THERAGRAN) tablet     naproxen sodium (ALEVE) 220 MG tablet     neomycin-polymyxin-dexamethamethasone (POLYDEX) 3.5 mg/g-10,000 unit/g-0.1 % Oint     OMEGA-3/DHA/EPA/FISH OIL (FISH OIL-OMEGA-3 FATTY ACIDS) 300-1,000 mg capsule     SUMAtriptan (IMITREX) 50 MG tablet     triamcinolone (KENALOG) 0.5 % cream     No current facility-administered medications for this visit.       Allergies   Allergen Reactions     Cefadroxil Unknown     Erythromycin Base [Erythromycin] Unknown     rash, vomit       Latex Unknown     Morphine Unknown       Past Medical History:   Diagnosis Date     Ehrlichiosis chafeensis (E. chafeensis)     Created by Conversion         Review of Systems  ROS:  Specifically negative for bowel/bladder dysfunction, balance changes, headache, dizziness, foot drop, fevers, chills, appetite changes, nausea/vomiting, unexplained weight loss. Otherwise 13 systems reviewed are negative. Please see the patient's intake questionnaire from today for details.    Reviewed Social, Family, Past Medical and Past Surgical history with patient, no significant changes noted since prior visit.     Objective:     /64   Pulse 72   SpO2 98%     PHYSICAL EXAMINATION:    --CONSTITUTIONAL: Well developed, well nourished, healthy appearing individual.  --PSYCHIATRIC: Appropriate mood and affect. No difficulty interacting due to temper, social  withdrawal, or memory issues.   --RESPIRATORY: Normal rhythm and effort. No abnormal accessory muscle breathing patterns noted.   --MUSCULOSKELETAL:  Normal lumbar lordosis noted, no lateral shift.  --GROSS MOTOR: Easily arises from a seated position. Gait is non-antalgic  --LUMBAR SPINE:  Inspection reveals no evidence of deformity. Range of motion is mildly limited in lumbar flexion, extension, lateral rotation.  Tenderness palpation of the greater trochanter area. Straight leg raising in the seated position is negative to radicular pain. Sciatic notch non-tender.   --SACROILIAC JOINT:   One Finger point test negative.  --LOWER EXTREMITY MOTOR TESTING:  Plantar flexion left 5/5, right 5/5   Dorsiflexion left 5/5, right 5/5   Great toe MTP extension left 5/5, right 5/5  Knee flexion left 5/5, right 5/5  Knee extension left 5/5, right 5/5   Hip flexion left 5/5, right 5/5  Hip abduction left 5/5, right 5/5  Hip adduction left 5/5, right 5/5   --NEUROLOGIC:  Sensation to light touch is intact in the bilateral L4, L5, and S1 dermatomes.    RESULTS:   Imaging: Lumbar spine imaging was reviewed today.                           Again, thank you for allowing me to participate in the care of your patient.        Sincerely,        Fab Harris, DO

## 2022-04-14 NOTE — PROGRESS NOTES
Assessment:     Diagnoses and all orders for this visit:  Greater trochanteric bursitis of right hip  -     PAIN US Large Joint Injection Unilateral; Future  Spondylosis of lumbar region without myelopathy or radiculopathy  Myofascial pain     Frances Mane is a 51 year old y.o. female with past medical history significant for migraine headache, vestibular neuronitis, cervicalgia, trochanteric bursitis, thyromegaly  who presents today for follow-up regarding right lateral thigh pain:    -Patient returns 1 year after greater trochanter bursa injection with ultrasound guidance.  She notes near 100% relief for 10 months.  Over the last 2 months pain has worsened.     Plan:     A shared decision making plan was used. The patient's values and choices were respected. Prior medical records from our last visit on 4/22/2021 were reviewed today. The following represents what was discussed and decided upon by the provider and the patient.        -DIAGNOSTIC TESTS: Images were personally reviewed and interpreted.   --Xray lumbar spine dated 1/21/2020 is personally viewed images interpreted and shown to the patient.  There is interspace narrowing between L5-S1.  There is mild facet arthropathy and mild hypertrophic changes in the inferior portion of the SI joints left greater than right.  --X-ray of the right hip dated 1/21/2020 is personally viewed images interpreted and shown to the patient.  There is normal joint space and alignment with no fracture.  --Bone density scan dated 1/24/2020 report is reviewed and shows normal bone density.    -INTERVENTIONS: I ordered a right greater trochanter bursa injection under ultrasound guidance.    -MEDICATIONS: No changes to medications.  -  Discussed side effects of medications and proper use. Patient verbalized understanding.    -PHYSICAL THERAPY: Recommend that she continue with home exercises on a continuous basis.  Discussed the importance of core strengthening, ROM, stretching  exercises with the patient and how each of these entities is important in decreasing pain.  Explained to the patient that the purpose of physical therapy is to teach the patient a home exercise program.  These exercises need to be performed every day in order to decrease pain and prevent future occurrences of pain.        -PATIENT EDUCATION: We discussed pain management in a multimodal fashion including physical therapy, medication management, possible future injections.    -FOLLOW UP: Patient will follow up as needed after injections.  Advised to contact clinic if symptoms worsen or change.    Subjective:     Frances Mane is a 51 year old female who presents today for follow-up regarding right lateral thigh pain.  Patient notes near 100% relief for 10 months with her last right greater trochanter bursa injection.  In the last 2 months pain has been worsening progressively.  Her pain today is 4/10 is worst is 10/10 is best is 1/10.  Pain is worse with walking up a hill and stairs and improved with ice, heat, rest and Aleve.  She also takes Tylenol which is somewhat helpful.  Her pain today is 4/10 is worst is 10/10 is best is 1/10.  She notes that she has had greater trochanter bursa injections without ultrasound guidance with minimal to no relief and would like another one with ultrasound.  She denies any bowel or bladder changes, fevers, chills, unintentional weight loss.    Evaluation to Date:Xray lumbar spine and right hip dated 1/21/2020.  Bone density scan dated 1/24/2020.     Treatment to Date: 4 sessions of physical therapy.    Right greater trochanter bursa injection under ultrasound guidance on 2/20/2020.  Regular trochanter bursa landmark injection on 3/22/2021.  Right greater trochanter bursa injection on 4/29/2021.    Patient Active Problem List   Diagnosis     Migraine Headache     Vestibular Neuronitis     Cervicalgia     Trochanteric Bursitis     Thyromegaly     Atrophic vaginitis     Genital  herpes simplex type 1 infection       Current Outpatient Medications   Medication     calcium carbonate-vitamin D2 500 mg(1,250mg) -200 unit tablet     calcium polycarbophil (FIBERCON) 625 mg tablet     cyclobenzaprine (FLEXERIL) 10 MG tablet     FLAXSEED OIL ORAL     hydrocortisone 2.5 % cream     multivitamin therapeutic (THERAGRAN) tablet     naproxen sodium (ALEVE) 220 MG tablet     neomycin-polymyxin-dexamethamethasone (POLYDEX) 3.5 mg/g-10,000 unit/g-0.1 % Oint     OMEGA-3/DHA/EPA/FISH OIL (FISH OIL-OMEGA-3 FATTY ACIDS) 300-1,000 mg capsule     SUMAtriptan (IMITREX) 50 MG tablet     triamcinolone (KENALOG) 0.5 % cream     No current facility-administered medications for this visit.       Allergies   Allergen Reactions     Cefadroxil Unknown     Erythromycin Base [Erythromycin] Unknown     rash, vomit       Latex Unknown     Morphine Unknown       Past Medical History:   Diagnosis Date     Ehrlichiosis chafeensis (E. chafeensis)     Created by Conversion         Review of Systems  ROS:  Specifically negative for bowel/bladder dysfunction, balance changes, headache, dizziness, foot drop, fevers, chills, appetite changes, nausea/vomiting, unexplained weight loss. Otherwise 13 systems reviewed are negative. Please see the patient's intake questionnaire from today for details.    Reviewed Social, Family, Past Medical and Past Surgical history with patient, no significant changes noted since prior visit.     Objective:     /64   Pulse 72   SpO2 98%     PHYSICAL EXAMINATION:    --CONSTITUTIONAL: Well developed, well nourished, healthy appearing individual.  --PSYCHIATRIC: Appropriate mood and affect. No difficulty interacting due to temper, social withdrawal, or memory issues.   --RESPIRATORY: Normal rhythm and effort. No abnormal accessory muscle breathing patterns noted.   --MUSCULOSKELETAL:  Normal lumbar lordosis noted, no lateral shift.  --GROSS MOTOR: Easily arises from a seated position. Gait is  non-antalgic  --LUMBAR SPINE:  Inspection reveals no evidence of deformity. Range of motion is mildly limited in lumbar flexion, extension, lateral rotation.  Tenderness palpation of the greater trochanter area. Straight leg raising in the seated position is negative to radicular pain. Sciatic notch non-tender.   --SACROILIAC JOINT:   One Finger point test negative.  --LOWER EXTREMITY MOTOR TESTING:  Plantar flexion left 5/5, right 5/5   Dorsiflexion left 5/5, right 5/5   Great toe MTP extension left 5/5, right 5/5  Knee flexion left 5/5, right 5/5  Knee extension left 5/5, right 5/5   Hip flexion left 5/5, right 5/5  Hip abduction left 5/5, right 5/5  Hip adduction left 5/5, right 5/5   --NEUROLOGIC:  Sensation to light touch is intact in the bilateral L4, L5, and S1 dermatomes.    RESULTS:   Imaging: Lumbar spine imaging was reviewed today.

## 2022-05-02 ENCOUNTER — RADIOLOGY INJECTION OFFICE VISIT (OUTPATIENT)
Dept: PHYSICAL MEDICINE AND REHAB | Facility: CLINIC | Age: 52
End: 2022-05-02
Attending: PAIN MEDICINE
Payer: COMMERCIAL

## 2022-05-02 VITALS
OXYGEN SATURATION: 99 % | SYSTOLIC BLOOD PRESSURE: 140 MMHG | DIASTOLIC BLOOD PRESSURE: 67 MMHG | HEART RATE: 82 BPM | TEMPERATURE: 97.9 F

## 2022-05-02 DIAGNOSIS — M70.61 GREATER TROCHANTERIC BURSITIS OF RIGHT HIP: ICD-10-CM

## 2022-05-02 PROCEDURE — 20611 DRAIN/INJ JOINT/BURSA W/US: CPT | Mod: RT | Performed by: PAIN MEDICINE

## 2022-05-02 RX ORDER — LIDOCAINE HYDROCHLORIDE 10 MG/ML
INJECTION, SOLUTION EPIDURAL; INFILTRATION; INTRACAUDAL; PERINEURAL
Status: COMPLETED | OUTPATIENT
Start: 2022-05-02 | End: 2022-05-02

## 2022-05-02 RX ORDER — METHYLPREDNISOLONE ACETATE 40 MG/ML
INJECTION, SUSPENSION INTRA-ARTICULAR; INTRALESIONAL; INTRAMUSCULAR; SOFT TISSUE
Status: COMPLETED | OUTPATIENT
Start: 2022-05-02 | End: 2022-05-02

## 2022-05-02 RX ORDER — LIDOCAINE HYDROCHLORIDE 20 MG/ML
INJECTION, SOLUTION EPIDURAL; INFILTRATION; INTRACAUDAL; PERINEURAL
Status: COMPLETED | OUTPATIENT
Start: 2022-05-02 | End: 2022-05-02

## 2022-05-02 RX ADMIN — LIDOCAINE HYDROCHLORIDE 3.5 ML: 20 INJECTION, SOLUTION EPIDURAL; INFILTRATION; INTRACAUDAL; PERINEURAL at 09:39

## 2022-05-02 RX ADMIN — METHYLPREDNISOLONE ACETATE 20 MG: 40 INJECTION, SUSPENSION INTRA-ARTICULAR; INTRALESIONAL; INTRAMUSCULAR; SOFT TISSUE at 09:39

## 2022-05-02 RX ADMIN — LIDOCAINE HYDROCHLORIDE 1 ML: 10 INJECTION, SOLUTION EPIDURAL; INFILTRATION; INTRACAUDAL; PERINEURAL at 09:38

## 2022-05-02 ASSESSMENT — PAIN SCALES - GENERAL: PAINLEVEL: MILD PAIN (2)

## 2022-05-02 NOTE — PATIENT INSTRUCTIONS
DISCHARGE INSTRUCTIONS    During office hours (8:00 a.m.- 4:00 p.m.) questions or concerns may be answered  by calling [facility] Navigation Nurses at  [phone].  Messages received after hours will be returned the following business day.      In the case of an emergency, please dial 911 or seek assistance at the nearest Emergency Room/Urgent Care facility.     All Patients:    You may experience an increase in your symptoms for the first 2 days (It may take anywhere between 2 days- 2 weeks for the steroid to have maximum effect).    You may use ice on the injection site, as frequently as 20 minutes each hour if needed.    You may take your pain medicine.    You may continue taking your regular medication after your injection. If you have had a Medial Branch Block you may resume pain medication once your pain diary is completed.    You may shower. No swimming, tub bath or hot tub for 48 hours.  You may remove your bandaid/bandage as soon as you are home.    You may resume light activities, as tolerated.    Resume your usual diet as tolerated.    It is strongly advised that you do not drive for 1-3 hours post injection.    If you have had oral sedation:  Do not drive for 8 hours post injection.      If you have had IV sedation:  Do not drive for 24 hours post injection.  Do not operate hazardous machinery or make important personal/business decisions for 24 hours.      POSSIBLE STEROID SIDE EFFECTS (If steroid/cortisone was used for your procedure)    -If you experience these symptoms, it should only last for a short period    Swelling of the legs              Skin redness (flushing)     Mouth (oral) irritation   Blood sugar (glucose) levels            Sweats                    Mood changes  Headache  Sleeplessness  Weakened immune system for up to 14 days, which could increase the risk of erik the COVID-19 virus and/or experiencing more severe symptoms of the disease, if exposed.  Decreased  effectiveness of the flu vaccine if given within 2 weeks of the steroid.         POSSIBLE PROCEDURE SIDE EFFECTS  -Call the Spine Center if you are concerned  Increased Pain           Increased numbness/tingling      Nausea/Vomiting          Bruising/bleeding at site      Redness or swelling                                              Difficulty walking      Weakness           Fever greater than 100.5    *In the event of a severe headache after an epidural steroid injection that is relieved by lying down, please call the [facility] to speak with a clinical staff member*

## 2022-05-28 ENCOUNTER — HEALTH MAINTENANCE LETTER (OUTPATIENT)
Age: 52
End: 2022-05-28

## 2022-07-07 DIAGNOSIS — Z79.899 MEDICATION MANAGEMENT: ICD-10-CM

## 2022-07-08 RX ORDER — SUMATRIPTAN 50 MG/1
TABLET, FILM COATED ORAL
Qty: 12 TABLET | Refills: 3 | Status: SHIPPED | OUTPATIENT
Start: 2022-07-08 | End: 2022-12-12

## 2022-08-18 ENCOUNTER — NURSE TRIAGE (OUTPATIENT)
Dept: NURSING | Facility: CLINIC | Age: 52
End: 2022-08-18

## 2022-08-18 ENCOUNTER — OFFICE VISIT (OUTPATIENT)
Dept: FAMILY MEDICINE | Facility: CLINIC | Age: 52
End: 2022-08-18
Payer: COMMERCIAL

## 2022-08-18 VITALS
HEART RATE: 85 BPM | BODY MASS INDEX: 27.6 KG/M2 | RESPIRATION RATE: 16 BRPM | WEIGHT: 150 LBS | SYSTOLIC BLOOD PRESSURE: 104 MMHG | TEMPERATURE: 100.4 F | DIASTOLIC BLOOD PRESSURE: 68 MMHG | HEIGHT: 62 IN | OXYGEN SATURATION: 97 %

## 2022-08-18 DIAGNOSIS — A69.20 ERYTHEMA MIGRANS (LYME DISEASE): Primary | ICD-10-CM

## 2022-08-18 DIAGNOSIS — R50.81 FEVER IN OTHER DISEASES: ICD-10-CM

## 2022-08-18 DIAGNOSIS — Z11.59 NEED FOR HEPATITIS C SCREENING TEST: ICD-10-CM

## 2022-08-18 LAB
BASOPHILS # BLD AUTO: 0 10E3/UL (ref 0–0.2)
BASOPHILS NFR BLD AUTO: 0 %
EOSINOPHIL # BLD AUTO: 0 10E3/UL (ref 0–0.7)
EOSINOPHIL NFR BLD AUTO: 0 %
ERYTHROCYTE [DISTWIDTH] IN BLOOD BY AUTOMATED COUNT: 11.9 % (ref 10–15)
FLUAV AG SPEC QL IA: NEGATIVE
FLUBV AG SPEC QL IA: NEGATIVE
HCT VFR BLD AUTO: 39.2 % (ref 35–47)
HCV AB SERPL QL IA: NONREACTIVE
HGB BLD-MCNC: 13.2 G/DL (ref 11.7–15.7)
LYMPHOCYTES # BLD AUTO: 0.5 10E3/UL (ref 0.8–5.3)
LYMPHOCYTES NFR BLD AUTO: 9 %
MCH RBC QN AUTO: 31.3 PG (ref 26.5–33)
MCHC RBC AUTO-ENTMCNC: 33.7 G/DL (ref 31.5–36.5)
MCV RBC AUTO: 93 FL (ref 78–100)
MONOCYTES # BLD AUTO: 0.5 10E3/UL (ref 0–1.3)
MONOCYTES NFR BLD AUTO: 8 %
NEUTROPHILS # BLD AUTO: 4.7 10E3/UL (ref 1.6–8.3)
NEUTROPHILS NFR BLD AUTO: 83 %
PLATELET # BLD AUTO: 240 10E3/UL (ref 150–450)
RBC # BLD AUTO: 4.22 10E6/UL (ref 3.8–5.2)
SARS-COV-2 RNA RESP QL NAA+PROBE: NEGATIVE
WBC # BLD AUTO: 5.7 10E3/UL (ref 4–11)

## 2022-08-18 PROCEDURE — U0005 INFEC AGEN DETEC AMPLI PROBE: HCPCS | Performed by: FAMILY MEDICINE

## 2022-08-18 PROCEDURE — U0003 INFECTIOUS AGENT DETECTION BY NUCLEIC ACID (DNA OR RNA); SEVERE ACUTE RESPIRATORY SYNDROME CORONAVIRUS 2 (SARS-COV-2) (CORONAVIRUS DISEASE [COVID-19]), AMPLIFIED PROBE TECHNIQUE, MAKING USE OF HIGH THROUGHPUT TECHNOLOGIES AS DESCRIBED BY CMS-2020-01-R: HCPCS | Performed by: FAMILY MEDICINE

## 2022-08-18 PROCEDURE — 86618 LYME DISEASE ANTIBODY: CPT | Performed by: FAMILY MEDICINE

## 2022-08-18 PROCEDURE — 86803 HEPATITIS C AB TEST: CPT | Performed by: FAMILY MEDICINE

## 2022-08-18 PROCEDURE — 99213 OFFICE O/P EST LOW 20 MIN: CPT | Mod: CS | Performed by: FAMILY MEDICINE

## 2022-08-18 PROCEDURE — 36415 COLL VENOUS BLD VENIPUNCTURE: CPT | Performed by: FAMILY MEDICINE

## 2022-08-18 PROCEDURE — 87804 INFLUENZA ASSAY W/OPTIC: CPT | Performed by: FAMILY MEDICINE

## 2022-08-18 PROCEDURE — 85025 COMPLETE CBC W/AUTO DIFF WBC: CPT | Performed by: FAMILY MEDICINE

## 2022-08-18 RX ORDER — FLUCONAZOLE 150 MG/1
150 TABLET ORAL ONCE
Qty: 1 TABLET | Refills: 0 | Status: SHIPPED | OUTPATIENT
Start: 2022-08-18 | End: 2022-08-18

## 2022-08-18 RX ORDER — DOXYCYCLINE 100 MG/1
100 CAPSULE ORAL 2 TIMES DAILY
Qty: 20 CAPSULE | Refills: 0 | Status: SHIPPED | OUTPATIENT
Start: 2022-08-18 | End: 2023-05-04

## 2022-08-18 ASSESSMENT — ENCOUNTER SYMPTOMS: FEVER: 1

## 2022-08-18 NOTE — PROGRESS NOTES
Assessment & Plan     Erythema migrans (Lyme disease)  Enlarging rash c/w erythema migrans, fever/myalgias also  - doxycycline hyclate (VIBRAMYCIN) 100 MG capsule; Take 1 capsule (100 mg) by mouth 2 times daily  - fluconazole (DIFLUCAN) 150 MG tablet; Take 1 tablet (150 mg) by mouth once for 1 dose    Fever in other diseases  Rash is c/w EM but will check COVID and Influenza   - CBC with platelets and differential; Future  - Symptomatic; Yes; 8/16/2022 COVID-19 Virus (Coronavirus) by PCR Nose  - Influenza A & B Antigen - Clinic Collect  - CBC with platelets and differential    Need for hepatitis C screening test  Routine screening as recommended.  Discussed and she is agreeable  - Hepatitis C Screen Reflex to HCV RNA Quant and Genotype    See Patient Instructions      Courtney Pisano MD  [facility] CHIVO Daniels is a 51 year old female presenting for evaluation of  Fever and Rash  She had felt something on the back of her arm and picked it off.  Not certain it was a tick.    Fever  Associated symptoms include a fever and a rash.   Rash  Associated symptoms include a fever and a rash.   History of Present Illness       Reason for visit:  Fever red [place] on arm headache backache  Symptom onset:  1-3 days ago    She eats 2-3 servings of fruits and vegetables daily.She consumes 0 sweetened beverage(s) daily.She exercises with enough effort to increase her heart rate 20 to 29 minutes per day.  She exercises with enough effort to increase her heart rate 4 days per week.   She is taking medications regularly.           Rash  Onset/Duration: x Monday (8/15)  Description  Location: R arm  Character: red, hot to touch  Itching: mild  Intensity:  moderate  Progression of Symptoms:  same and constant  Accompanying signs and symptoms:   Fever: YES - 103 at the highest  Body aches or joint pain: YES  Sore throat symptoms: No  Recent cold symptoms: No  History:           Previous episodes of  "similar rash: None  New exposures:  None  Recent travel: No  Exposure to similar rash: No  Precipitating or alleviating factors: benadryl  Therapies tried and outcome: Benadryl/diphenhydramine -  effective  Review of Systems   Constitutional: Positive for fever.   Skin: Positive for rash.      Constitutional, HEENT, cardiovascular, pulmonary, gi and gu systems are negative, except as otherwise noted.      Objective    /68 (BP Location: Right arm, Patient Position: Sitting, Cuff Size: Adult Large)   Pulse 85   Temp 100.4  F (38  C) (Tympanic)   Resp 16   Ht 1.575 m (5' 2\")   Wt 68 kg (150 lb)   SpO2 97%   Breastfeeding No   BMI 27.44 kg/m    Body mass index is 27.44 kg/m .  Physical Exam   GENERAL: healthy, alert and no distress  EYES: Eyes grossly normal to inspection, PERRL and conjunctivae and sclerae normal  HENT: ear canals and TM's normal, nose and mouth without ulcers or lesions  NECK: no adenopathy, no asymmetry, masses, or scars and thyroid normal to palpation  RESP: lungs clear to auscultation - no rales, rhonchi or wheezes  CV: regular rate and rhythm, normal S1 S2, no S3 or S4, no murmur, click or rub, no peripheral edema and peripheral pulses strong  ABDOMEN: soft, nontender, no hepatosplenomegaly, no masses and bowel sounds normal  MS: no gross musculoskeletal defects noted, no edema  SKIN: large (about 15 cm) [place] of erythema on right upper posterior arm with some central clearing, no vesicles, pustules, or open areas.    Results for orders placed or performed in visit on 08/18/22 (from the past 24 hour(s))   Symptomatic; Yes; 8/16/2022 COVID-19 Virus (Coronavirus) by PCR Nose    Specimen: Nose; Swab   Result Value Ref Range    SARS CoV2 PCR Negative Negative    Narrative    Testing was performed using the Aptima SARS-CoV-2 Assay on the  [employer] Instrument System. Additional information about this  Emergency Use Authorization (EUA) assay can be found via the Lab  Guide. This test should " be ordered for the detection of SARS-CoV-2 in  individuals who meet SARS-CoV-2 clinical and/or epidemiological  criteria. Test performance is unknown in asymptomatic patients. This  test is for in vitro diagnostic use under the FDA EUA for  laboratories certified under CLIA to perform high complexity testing.  This test has not been FDA cleared or approved. A negative result  does not rule out the presence of PCR inhibitors in the specimen or  target RNA in concentration below the limit of detection for the  assay. The possibility of a false negative should be considered if  the patient's recent exposure or clinical presentation suggests  COVID-19. This test was validated by the [facility]. This laboratory is certified under  the Clinical Laboratory Improvement Amendments of 1988 (CLIA-88) as  qualified to perform high complexity laboratory testing.   Influenza A & B Antigen - Clinic Collect    Specimen: Nasopharyngeal; Swab   Result Value Ref Range    Influenza A antigen Negative Negative    Influenza B antigen Negative Negative    Narrative    Test results must be correlated with clinical data. If necessary, results should be confirmed by a molecular assay or viral culture.   Hepatitis C Screen Reflex to HCV RNA Quant and Genotype   Result Value Ref Range    Hepatitis C Antibody Nonreactive Nonreactive    Narrative    Assay performance characteristics have not been established for newborns, infants, and children.   CBC with platelets and differential    Narrative    The following orders were created for panel order CBC with platelets and differential.  Procedure                               Abnormality         Status                     ---------                               -----------         ------                     CBC with platelets and d...[[id number]]  Abnormal            Final result                 Please view results for these tests on the individual  orders.   CBC with platelets and differential   Result Value Ref Range    WBC Count 5.7 4.0 - 11.0 10e3/uL    RBC Count 4.22 3.80 - 5.20 10e6/uL    Hemoglobin 13.2 11.7 - 15.7 g/dL    Hematocrit 39.2 35.0 - 47.0 %    MCV 93 78 - 100 fL    MCH 31.3 26.5 - 33.0 pg    MCHC 33.7 31.5 - 36.5 g/dL    RDW 11.9 10.0 - 15.0 %    Platelet Count 240 150 - 450 10e3/uL    % Neutrophils 83 %    % Lymphocytes 9 %    % Monocytes 8 %    % Eosinophils 0 %    % Basophils 0 %    Absolute Neutrophils 4.7 1.6 - 8.3 10e3/uL    Absolute Lymphocytes 0.5 (L) 0.8 - 5.3 10e3/uL    Absolute Monocytes 0.5 0.0 - 1.3 10e3/uL    Absolute Eosinophils 0.0 0.0 - 0.7 10e3/uL    Absolute Basophils 0.0 0.0 - 0.2 10e3/uL         .  ..

## 2022-08-18 NOTE — PATIENT INSTRUCTIONS
Please take the doxycycline as directed for 10 days.      Return if new or worsening symptoms develop

## 2022-08-18 NOTE — TELEPHONE ENCOUNTER
Nurse Triage SBAR    Is this a 2nd Level Triage? YES, LICENSED PRACTITIONER REVIEW IS REQUIRED    Situation:   Possible tick bite     Background:   Pt has been running a fever for past 3 days and has a red Nikolski on her right upper arm     Headache and backache     Temperature 103.0 - oral      Assessment:   Pt has been running a fever for past 3 days and has a red Nikolski on her right upper arm     Headache and backache     Temperature 103.0 - oral    Protocol Recommended Disposition:   Go to ED Now (Or PCP Triage)   Paged Dr. Joanne Alas at 7:13am advised that pt be seen today    Recommendation:   Dr. Alas advised that pt be seen by a provider today    Writer phoned pt back and she was on her way to the clinic and was able to get an appointment for this morning at  7:40am    Paged to provider COVID 19 Nurse Triage Plan/Patient Instructions    Please be aware that novel coronavirus (COVID-19) may be circulating in the community. If you develop symptoms such as fever, cough, or SOB or if you have concerns about the presence of another infection including coronavirus (COVID-19), please contact your health care provider or visit [email or web address].     Disposition/Instructions    In-Person Visit with provider recommended. Reference Visit Selection Guide.    Thank you for taking steps to prevent the spread of this virus.  o Limit your contact with others.  o Wear a simple mask to cover your cough.  o Wash your hands well and often.    Resources    [facility]: About COVID-19: [email or web address]    CDC: What to Do If You're Sick: www.cdc.gov/coronavirus/2019-ncov/about/steps-when-sick.html    CDC: Ending Home Isolation: www.cdc.gov/coronavirus/2019-ncov/hcp/disposition-in-home-patients.html     CDC: Caring for Someone: www.cdc.gov/coronavirus/2019-ncov/if-you-are-sick/care-for-someone.html     NIKKIE: Interim Guidance for Hospital Discharge to Home:  www.health.[facility].[place].us/diseases/coronavirus/hcp/hospdischarge.pdf    [facility] clinical trials (COVID-19 research studies): [email or web address]-trials     Below are the COVID-19 hotlines at the [facility] ([facility]). Interpreters are available.   o For health questions: Call 924-447-3934 or 1-840.383.4768 (7 a.m. to 7 p.m.)  o For questions about schools and childcare: Call 034-141-2565 or 1-634.174.7192 (7 a.m. to 7 p.m.)                     Does the patient meet one of the following criteria for ADS visit consideration? 16+ years old, with an [facility] PCP     TIP  Providers, please consider if this condition is appropriate for management at one of our Acute and Diagnostic Services sites.     If patient is a good candidate, please use dotphrase <dot>triageresponse and select Refer to ADS to document.      Reason for Disposition    [1] 2 to 14 days following tick bite AND [2] severe headache with fever occurs    Additional Information    Negative: Sounds like a life-threatening emergency to the triager    Negative: Not a tick bite    Protocols used: TICK BITE-A-

## 2022-08-21 ENCOUNTER — NURSE TRIAGE (OUTPATIENT)
Dept: NURSING | Facility: CLINIC | Age: 52
End: 2022-08-21

## 2022-08-21 ENCOUNTER — TELEPHONE (OUTPATIENT)
Dept: FAMILY MEDICINE | Facility: CLINIC | Age: 52
End: 2022-08-21

## 2022-08-21 DIAGNOSIS — A69.20 ERYTHEMA MIGRANS (LYME DISEASE): Primary | ICD-10-CM

## 2022-08-21 NOTE — TELEPHONE ENCOUNTER
I am going to send this message back to the provider that had seen this patient for their imput if needed, looks like it was triaged.  Also I ordered a Lyme test on to see if this could be added to her previous draw.  Thank you.

## 2022-08-21 NOTE — TELEPHONE ENCOUNTER
"Triage Call:     Pt calling to report ongoing headache. Pt was seen for this on 8/18/2022 and was started on Doxycycline on this date to due finding a tic bite with suspected Lyme Disease.     Pt reports that her headache is worse than on 8/18/2022 and is asking if it could be a side effect of the medication. She is asking if there is anything else she should be taking; should she stop the Doxycycline or take an alternative.     Head pressure, headache, neck pain; able to touch her chin to her chest right now    Tylenol did not help for a long period of time. Her headache is back at a pain level of 9/10  Using ice packs as well for the pain  No fever    Pt has an order for Imitrex, but has not taken any of this yet. She reports she didn't take it yet because she didn't want to \"mask any sx\" she was having.    8:12am writer paged the on call provider via the call center due to provider not having a pager. Provider: Dr. Shanon Jessica.     At 8:30am the back up provider was paged: Dr. Guzman, who gave the following recommendations:   1) Have patient take 1000mg Acetaminophen every 6 hours; alternating with Ibuprofen  2) Continue with the Doxycycline  3) If headache and neck pain persist over the next 24 hours; see provider.     Writer called the patient back to give patient this information. Pt reports that she does not take ibuprofen, but that she did take an Imitrex and it is \"already helping her headache\" Pt thought that she had a lyme test done already, but it looks like it was \"doscontinued\" per epic.     Writer is routing a message to patient's PCP to address if patient should be doing this lab this week. PCP and/or care team to follow up with patient on the lab.     Meg Padron RN  [facility] Nurse Advisor 9:01 AM 8/21/2022      Reason for Disposition    [1] SEVERE headache (e.g., excruciating) AND [2] not improved after 2 hours of pain medicine    [place] tick bite with no complications    [1] 2 to 14 " "days following tick bite AND [2] widespread rash or headache AND [3] no fever    Additional Information    Negative: Difficult to awaken or acting confused (e.g., disoriented, slurred speech)    Negative: [1] Weakness of the face, arm or leg on one side of the body AND [2] new-onset    Negative: [1] Numbness of the face, arm or leg on one side of the body AND [2] new-onset    Negative: [1] Loss of speech or garbled speech AND [2] new-onset    Negative: Passed out (i.e., lost consciousness, collapsed and was not responding)    Negative: Sounds like a life-threatening emergency to the triager    Negative: Followed a head injury    Negative: Pregnant    Negative: Postpartum (from 0 to 6 weeks after delivery)    Negative: Traumatic Brain Injury (TBI) is suspected    Negative: Unable to walk, or can only walk with assistance (e.g., requires support)    Negative: Stiff neck (can't touch chin to chest)    Negative: Severe pain in one eye    Negative: [1] Other family members (or roommates) with headaches AND [2] possibility of carbon monoxide exposure    Negative: [1] SEVERE headache (e.g., excruciating) AND [2] \"worst headache\" of life    Negative: [1] SEVERE headache AND [2] sudden-onset (i.e., reaching maximum intensity within seconds to 1 hour)    Negative: [1] SEVERE headache AND [2] fever    Negative: Loss of vision or double vision (Exception: same as prior migraines)    Negative: [1] Fever > 100.0 F (37.8 C) AND [2] diabetes mellitus or weak immune system (e.g., HIV positive, cancer chemo, splenectomy, organ transplant, chronic steroids)    Negative: Patient sounds very sick or weak to the triager    Negative: [1] Vomiting AND [2] 2 or more times (Exception: similar to previous migraines)    Negative: Fever > 104 F (40 C)    Negative: Sounds like a life-threatening emergency to the triager    Negative: Not a tick bite    Negative: [1] 2 to 14 days following tick bite AND [2] severe headache with fever occurs    " Negative: [1] 2 to 14 days following tick bite AND [2] widespread rash with fever occurs    Negative: Patient sounds very sick or weak to the triager    Negative: [1] Fever AND [2] spreading red area or streak    Negative: [1] Fever AND [2] area is very tender to touch    Negative: [1] Red streak or red line AND [2] length > 2 inches (5 cm)    Negative: Can't remove live tick (after trying Care Advice)    Negative: [1] Probable deer tick AND [2] attached 36 hours or more (or tick appears swollen, not flat) AND [3] occurred in an area where Lyme disease is common    Negative: [1] Scab is present AND [2] it drains pus or increases in size AND [3] not improved after applying antibiotic ointment for 2 days    Negative: [1] 2 to 14 days following tick bite AND [2] fever AND [3] no rash or headache    Negative: [1] Red or very tender (to touch) area AND [2] started over 24 hours after the bite    Negative: Red ring or bull's-eye rash occurs at tick bite    Protocols used: HEADACHE-A-AH, TICK BITE-A-AH

## 2022-08-22 LAB — B BURGDOR IGG+IGM SER QL: 0.15

## 2022-08-22 NOTE — TELEPHONE ENCOUNTER
Attempted to call patient to provide update with Lyme test/orders, left message stating that I sent a MyChart and for return call to clinic with any questions/concerns. I did send patient a MyChart message stating that PCP has added on orders for Lyme testing from last weeks sample, which appears to currently be in process.    Blanca Sebastian RN

## 2022-09-06 ENCOUNTER — ANCILLARY PROCEDURE (OUTPATIENT)
Dept: MAMMOGRAPHY | Facility: CLINIC | Age: 52
End: 2022-09-06
Attending: FAMILY MEDICINE
Payer: COMMERCIAL

## 2022-09-06 DIAGNOSIS — Z12.31 VISIT FOR SCREENING MAMMOGRAM: ICD-10-CM

## 2022-09-06 PROCEDURE — 77067 SCR MAMMO BI INCL CAD: CPT

## 2022-09-27 ENCOUNTER — MYC REFILL (OUTPATIENT)
Dept: FAMILY MEDICINE | Facility: CLINIC | Age: 52
End: 2022-09-27

## 2022-09-27 DIAGNOSIS — L30.9 ECZEMA, UNSPECIFIED TYPE: ICD-10-CM

## 2022-09-27 DIAGNOSIS — R52 PAIN: ICD-10-CM

## 2022-09-27 RX ORDER — CYCLOBENZAPRINE HCL 10 MG
10 TABLET ORAL 3 TIMES DAILY PRN
Qty: 60 TABLET | Refills: 3 | Status: SHIPPED | OUTPATIENT
Start: 2022-09-27 | End: 2024-04-23

## 2022-09-27 NOTE — TELEPHONE ENCOUNTER
Routing refill request to provider for review/approval because:  Drug not on the [facility] refill protocol     Last Written Prescription Date:  2/2/21  Last Fill Quantity: 60,  # refills: 3   Last office visit provider:  5/20/21     Requested Prescriptions   Pending Prescriptions Disp Refills     cyclobenzaprine (FLEXERIL) 10 MG tablet 60 tablet 3     Sig: Take 1 tablet (10 mg) by mouth 3 times daily as needed       There is no refill protocol information for this order          Mariely Schneider, RN 09/27/22 3:45 PM

## 2022-09-27 NOTE — TELEPHONE ENCOUNTER
Routing refill request to provider for review/approval because:  Drug not on the [facility] refill protocol   A break in medication    Last Written Prescription Date:  10/17/19  Last Fill Quantity: 30,  # refills: 0   Last office visit provider:  5/20/21     Requested Prescriptions   Pending Prescriptions Disp Refills     neomycin-polymixin-dexamethasone (MAXITROL) ophthalmic ointment 30 g 0       There is no refill protocol information for this order          Mariely Schneider RN 09/27/22 3:43 PM

## 2022-10-01 ENCOUNTER — HEALTH MAINTENANCE LETTER (OUTPATIENT)
Age: 52
End: 2022-10-01

## 2022-12-12 DIAGNOSIS — Z79.899 MEDICATION MANAGEMENT: ICD-10-CM

## 2022-12-12 RX ORDER — SUMATRIPTAN 50 MG/1
TABLET, FILM COATED ORAL
Qty: 12 TABLET | Refills: 3 | Status: SHIPPED | OUTPATIENT
Start: 2022-12-12 | End: 2023-05-31

## 2022-12-12 NOTE — TELEPHONE ENCOUNTER
"Routing refill request to provider for review/approval because:  Needs review    Last Written Prescription Date:  7/8/22  Last Fill Quantity: 12,  # refills: 3   Last office visit provider:  5/20/21     Requested Prescriptions   Pending Prescriptions Disp Refills     SUMAtriptan (IMITREX) 50 MG tablet [Pharmacy Med Name: SUMATRIPTAN SUCC 50 MG TABLET] 12 tablet 3     Sig: TAKE 1 - 2 TABLETS BY MOUTH EVERY 2 HOURS AS NEEDED. MAX OF 4 TABLETS PER 24 HOURS       Serotonin Agonists Failed - 12/12/2022 12:37 AM        Failed - Serotonin Agonist request needs review.     Please review patient's record. If patient has had 8 or more treatments in the past month, please forward to provider.          Passed - Blood pressure under 140/90 in past 12 months     BP Readings from Last 3 Encounters:   08/18/22 104/68   05/02/22 (!) 140/67   04/14/22 120/64                 Passed - Recent (12 mo) or future (30 days) visit within the authorizing provider's specialty     Patient has had an office visit with the authorizing provider or a provider within the authorizing providers department within the previous 12 mos or has a future within next 30 days. See \"Patient Info\" tab in inbasket, or \"Choose Columns\" in Meds & Orders section of the refill encounter.              Passed - Medication is active on med list        Passed - Patient is age 18 or older        Passed - No active pregnancy on record        Passed - No positive pregnancy test in past 12 months             Mariely Schneider RN 12/12/22 11:09 AM  "

## 2023-05-04 ENCOUNTER — OFFICE VISIT (OUTPATIENT)
Dept: PHYSICAL MEDICINE AND REHAB | Facility: CLINIC | Age: 53
End: 2023-05-04
Payer: COMMERCIAL

## 2023-05-04 VITALS
HEIGHT: 62 IN | DIASTOLIC BLOOD PRESSURE: 64 MMHG | SYSTOLIC BLOOD PRESSURE: 116 MMHG | WEIGHT: 145 LBS | BODY MASS INDEX: 26.68 KG/M2

## 2023-05-04 DIAGNOSIS — M54.12 LEFT CERVICAL RADICULOPATHY: ICD-10-CM

## 2023-05-04 DIAGNOSIS — M51.369 DDD (DEGENERATIVE DISC DISEASE), LUMBAR: ICD-10-CM

## 2023-05-04 DIAGNOSIS — M54.50 CHRONIC BILATERAL LOW BACK PAIN WITHOUT SCIATICA: ICD-10-CM

## 2023-05-04 DIAGNOSIS — M70.61 GREATER TROCHANTERIC BURSITIS OF RIGHT HIP: Primary | ICD-10-CM

## 2023-05-04 DIAGNOSIS — G89.29 CHRONIC BILATERAL LOW BACK PAIN WITHOUT SCIATICA: ICD-10-CM

## 2023-05-04 PROCEDURE — 99204 OFFICE O/P NEW MOD 45 MIN: CPT | Performed by: NURSE PRACTITIONER

## 2023-05-04 ASSESSMENT — PAIN SCALES - GENERAL: PAINLEVEL: MODERATE PAIN (4)

## 2023-05-04 NOTE — PATIENT INSTRUCTIONS
~Please call our [facility] Nurse Navigation line [phone] with any questions or concerns about your treatment plan, if symptoms worsen and you would like to be seen urgently, or if you have problems controlling bladder and bowel function.  ~Please note that any My Chart messages may take multiple days for a response due to the high volume of patients seen in clinic.   Anything sent Thursday night or after will be answered the following week when able, as Tania Renteria CNP does not work in clinic on Fridays.        ~You have been referred for Physical Therapy to [facility]. They will call you to schedule an appointment.      Scheduling phone number is [phone] for [facility], [place], or [place] location.  If you have not heard from the scheduling office within 2 business days, please call [phone] for ALL other locations.    Discussed the importance of core strengthening, ROM, stretching exercises and how each of these entities is important in decreasing pain and improving long term spine health.  The purpose of physical therapy is to teach you an individualized home exercise program.  These exercises need to be performed every day in order to decrease pain and prevent future occurrences of pain.

## 2023-05-04 NOTE — PROGRESS NOTES
Assessment:     Diagnoses and all orders for this visit:  Greater trochanteric bursitis of right hip  -     Physical Therapy Referral; Future  -     PAIN US Large Joint Injection Unilateral; Future  Chronic bilateral low back pain without sciatica  -     Physical Therapy Referral; Future  DDD (degenerative disc disease), lumbar  -     Physical Therapy Referral; Future  Left cervical radiculopathy  -     Physical Therapy Referral; Future     Frances Mane is a 52 year old y.o. female with past medical history significant for migraine headache, vestibular neuronitis, cervicalgia, trochanteric bursitis, thyromegaly who presents today for follow-up regarding:    -Right greater trochanteric/peritrochanteric bursitis, recurrent    -Chronic bilateral low back pain.  L5-S1 degenerative changes noted on x-ray.    -Chronic mild left cervical radiculopathy nonspecific.     Plan:     A shared decision making plan was used. The patient's values and choices were respected. Prior medical records were reviewed today. The following represents what was discussed and decided upon by the provider and the patient.        -DIAGNOSTIC TESTS: Images were personally reviewed and interpreted.   -- Discussed that either if her back pain or left arm pain worsen at any time we could consider cervical and/or lumbar spine MRI.  Patient is okay with holding off and wants to trial physical therapy first.  --Xray lumbar spine dated 1/21/2020 is personally viewed images interpreted and shown to the patient.  There is interspace narrowing between L5-S1.  There is mild facet arthropathy and mild hypertrophic changes in the inferior portion of the SI joints left greater than right.  --X-ray of the right hip dated 1/21/2020 is personally viewed images interpreted and shown to the patient.  There is normal joint space and alignment with no fracture.  --Bone density scan dated 1/24/2020 report is reviewed and shows normal bone density  --Cervical spine  MRI 2009, no images or report available however.    -INTERVENTIONS: Ordered repeat RIGHT peritrochanteric bursa steroid injection under ultrasound guidance with Dr. Harris.  She did well with this injection last year with relief for almost 1 year.  Tenderness over right greater trochanteric bursa region.    -MEDICATIONS: No changes in medications today.    -PHYSICAL THERAPY: Referral to physical therapy cervical and lumbar MedX program for intensive core strengthening as well as reestablishing home exercises for chronic neck and back pain.  She does continue with home exercises from prior PT and has done cervical MedX program with [facility] many years ago but is interested in both cervical and lumbar which is reasonable given her neck and back pain.  Discussed the importance of core strengthening, ROM, stretching exercises with the patient and how each of these entities is important in decreasing pain.  Explained to the patient that the purpose of physical therapy is to teach the patient a home exercise program.  These exercises need to be performed every day in order to decrease pain and prevent future occurrences of pain.        -PATIENT EDUCATION:  Total time of 32 minutes, on the day of service, spent with the patient, reviewing the chart, placing orders, and documenting.   -Today we also discussed the issues related to the current COVID-19 pandemic, the pros and cons of the current treatment plan, the CDC guidelines such as social distancing, washing the hands, and covering the cough.    -FOLLOW UP: Follow-up for injection with Dr. Harris  Advised to contact clinic if symptoms worsen or change.    Subjective:     Frances Mane is a 52 year old female who presents today for follow-up regarding recurrent most significant right lateral hip pain and current pain is a 4/10 up to a 10 at its worst specifically with walking uphill.  She reports that symptoms are recurrent and more severe over the last 1  week.  Previously she reports significant relief with greater trochanteric bursa injection under ultrasound with Dr. Harris last year up until again the last week.  She is hoping for repeat injection.  She is also endorsing chronic generalized bilateral low back pain that is been slightly worse recently as well and a lot of stiffness first thing in the morning.  Patient also endorses chronic left neck and left upper extremity arm pain with numbness and tingling that is chronic and intermittent.    Denies upper or lower extremity weakness.  Denies any recent trips or falls or balance changes.  Denies bowel or bladder loss control.    Evaluation to Date:Xray lumbar spine and right hip dated 1/21/2020.  Bone density scan dated 1/24/2020.     Treatment to Date:   4 sessions of physical therapy.      Right greater trochanter bursa injection under ultrasound guidance on 2/20/2020.    Regular trochanter bursa landmark injection on 3/22/2021.    ight greater trochanter bursa injection on 4/29/2021.  RIGHT peritrochanteric bursa steroid injection under ultrasound guidance 5/2/2022.    Patient Active Problem List   Diagnosis     Migraine Headache     Vestibular Neuronitis     Cervicalgia     Trochanteric Bursitis     Thyromegaly     Atrophic vaginitis     Genital herpes simplex type 1 infection       Current Outpatient Medications   Medication     cyclobenzaprine (FLEXERIL) 10 MG tablet     calcium carbonate-vitamin D2 500 mg(1,250mg) -200 unit tablet     calcium polycarbophil (FIBERCON) 625 mg tablet     FLAXSEED OIL ORAL     hydrocortisone 2.5 % cream     multivitamin therapeutic (THERAGRAN) tablet     naproxen sodium (ALEVE) 220 MG tablet     neomycin-polymixin-dexamethasone (MAXITROL) ophthalmic ointment     OMEGA-3/DHA/EPA/FISH OIL (FISH OIL-OMEGA-3 FATTY ACIDS) 300-1,000 mg capsule     SUMAtriptan (IMITREX) 50 MG tablet     triamcinolone (KENALOG) 0.5 % cream     No current facility-administered medications for this  "visit.       Allergies   Allergen Reactions     Cefadroxil Unknown     Erythromycin Base [Erythromycin] Unknown     rash, vomit       Latex Unknown     Morphine Unknown       Past Medical History:   Diagnosis Date     Ehrlichiosis chafeensis (E. chafeensis)     Created by Conversion         Review of Systems  ROS:  Specifically negative for bowel/bladder dysfunction, balance changes, headache, dizziness, foot drop, fevers, chills, appetite changes, nausea/vomiting, unexplained weight loss. Otherwise 13 systems reviewed are negative. Please see the patient's intake questionnaire from today for details.    Reviewed Social, Family, Past Medical and Past Surgical history with patient, no significant changes noted since prior visit.     Objective:     /64 (BP Location: Right arm, Patient Position: Sitting)   Ht 5' 2\" (1.575 m)   Wt 145 lb (65.8 kg)   BMI 26.52 kg/m      PHYSICAL EXAMINATION:    --CONSTITUTIONAL: Well developed, well nourished, healthy appearing individual.  --PSYCHIATRIC: Appropriate mood and affect. No difficulty interacting due to temper, social withdrawal, or memory issues.  --SKIN: Lumbar region is dry and intact.   --RESPIRATORY: Normal rhythm and effort. No abnormal accessory muscle breathing patterns noted.   --MUSCULOSKELETAL:  Normal lumbar lordosis noted, no lateral shift.  --GROSS MOTOR: Easily arises from a seated position. Gait is non-antalgic  --LUMBAR SPINE:  Inspection reveals no evidence of deformity. Range of motion is not limited in lumbar flexion, extension, lateral rotation. No tenderness to palpation lumbar spine.  Tenderness over right lateral hip/greater trochanteric bursa region.  --LOWER EXTREMITY MOTOR TESTING:  Plantar flexion left 5/5, right 5/5   Dorsiflexion left 5/5, right 5/5   Great toe MTP extension left 5/5, right 5/5  Knee flexion left 5/5, right 5/5  Knee extension left 5/5, right 5/5   Hip flexion left 5/5, right 5/5  Hip abduction left 5/5, right 5/5  Hip " adduction left 5/5, right 5/5   --HIPS: Full range of motion bilaterally.   --NEUROLOGIC: Bilateral patellar and achilles reflexes are physiologic and symmetric. Sensation to light touch is intact in the bilateral L4, L5, and S1 dermatomes.    RESULTS:   Imaging: Spine imaging was reviewed today. The images were shown to the patient and the findings were explained using a spine model.      Lumbar spine x-ray reviewed.

## 2023-05-04 NOTE — LETTER
5/4/2023         RE: Frances Mane  [address] [place] [address]        Dear Colleague,    Thank you for referring your patient, Frances Mane, to the [facility]. Please see a copy of my visit note below.      Assessment:     Diagnoses and all orders for this visit:  Greater trochanteric bursitis of right hip  -     Physical Therapy Referral; Future  -     PAIN US Large Joint Injection Unilateral; Future  Chronic bilateral low back pain without sciatica  -     Physical Therapy Referral; Future  DDD (degenerative disc disease), lumbar  -     Physical Therapy Referral; Future  Left cervical radiculopathy  -     Physical Therapy Referral; Future     Frances Mane is a 52 year old y.o. female with past medical history significant for migraine headache, vestibular neuronitis, cervicalgia, trochanteric bursitis, thyromegaly who presents today for follow-up regarding:    -Right greater trochanteric/peritrochanteric bursitis, recurrent    -Chronic bilateral low back pain.  L5-S1 degenerative changes noted on x-ray.    -Chronic mild left cervical radiculopathy nonspecific.     Plan:     A shared decision making plan was used. The patient's values and choices were respected. Prior medical records were reviewed today. The following represents what was discussed and decided upon by the provider and the patient.        -DIAGNOSTIC TESTS: Images were personally reviewed and interpreted.   -- Discussed that either if her back pain or left arm pain worsen at any time we could consider cervical and/or lumbar spine MRI.  Patient is okay with holding off and wants to trial physical therapy first.  --Xray lumbar spine dated 1/21/2020 is personally viewed images interpreted and shown to the patient.  There is interspace narrowing between L5-S1.  There is mild facet arthropathy and mild hypertrophic changes in the inferior portion of the SI joints left greater than right.  --X-ray of  the right hip dated 1/21/2020 is personally viewed images interpreted and shown to the patient.  There is normal joint space and alignment with no fracture.  --Bone density scan dated 1/24/2020 report is reviewed and shows normal bone density  --Cervical spine MRI 2009, no images or report available however.    -INTERVENTIONS: Ordered repeat RIGHT peritrochanteric bursa steroid injection under ultrasound guidance with Dr. Harris.  She did well with this injection last year with relief for almost 1 year.  Tenderness over right greater trochanteric bursa region.    -MEDICATIONS: No changes in medications today.    -PHYSICAL THERAPY: Referral to physical therapy cervical and lumbar MedX program for intensive core strengthening as well as reestablishing home exercises for chronic neck and back pain.  She does continue with home exercises from prior PT and has done cervical MedX program with [facility] many years ago but is interested in both cervical and lumbar which is reasonable given her neck and back pain.  Discussed the importance of core strengthening, ROM, stretching exercises with the patient and how each of these entities is important in decreasing pain.  Explained to the patient that the purpose of physical therapy is to teach the patient a home exercise program.  These exercises need to be performed every day in order to decrease pain and prevent future occurrences of pain.        -PATIENT EDUCATION:  Total time of 32 minutes, on the day of service, spent with the patient, reviewing the chart, placing orders, and documenting.   -Today we also discussed the issues related to the current COVID-19 pandemic, the pros and cons of the current treatment plan, the CDC guidelines such as social distancing, washing the hands, and covering the cough.    -FOLLOW UP: Follow-up for injection with Dr. Harris  Advised to contact clinic if symptoms worsen or change.    Subjective:     Frances Mane is a 52 year old female  who presents today for follow-up regarding recurrent most significant right lateral hip pain and current pain is a 4/10 up to a 10 at its worst specifically with walking uphill.  She reports that symptoms are recurrent and more severe over the last 1 week.  Previously she reports significant relief with greater trochanteric bursa injection under ultrasound with Dr. Harris last year up until again the last week.  She is hoping for repeat injection.  She is also endorsing chronic generalized bilateral low back pain that is been slightly worse recently as well and a lot of stiffness first thing in the morning.  Patient also endorses chronic left neck and left upper extremity arm pain with numbness and tingling that is chronic and intermittent.    Denies upper or lower extremity weakness.  Denies any recent trips or falls or balance changes.  Denies bowel or bladder loss control.    Evaluation to Date:Xray lumbar spine and right hip dated 1/21/2020.  Bone density scan dated 1/24/2020.     Treatment to Date:   4 sessions of physical therapy.      Right greater trochanter bursa injection under ultrasound guidance on 2/20/2020.    Regular trochanter bursa landmark injection on 3/22/2021.    ight greater trochanter bursa injection on 4/29/2021.  RIGHT peritrochanteric bursa steroid injection under ultrasound guidance 5/2/2022.    Patient Active Problem List   Diagnosis     Migraine Headache     Vestibular Neuronitis     Cervicalgia     Trochanteric Bursitis     Thyromegaly     Atrophic vaginitis     Genital herpes simplex type 1 infection       Current Outpatient Medications   Medication     cyclobenzaprine (FLEXERIL) 10 MG tablet     calcium carbonate-vitamin D2 500 mg(1,250mg) -200 unit tablet     calcium polycarbophil (FIBERCON) 625 mg tablet     FLAXSEED OIL ORAL     hydrocortisone 2.5 % cream     multivitamin therapeutic (THERAGRAN) tablet     naproxen sodium (ALEVE) 220 MG tablet      "neomycin-polymixin-dexamethasone (MAXITROL) ophthalmic ointment     OMEGA-3/DHA/EPA/FISH OIL (FISH OIL-OMEGA-3 FATTY ACIDS) 300-1,000 mg capsule     SUMAtriptan (IMITREX) 50 MG tablet     triamcinolone (KENALOG) 0.5 % cream     No current facility-administered medications for this visit.       Allergies   Allergen Reactions     Cefadroxil Unknown     Erythromycin Base [Erythromycin] Unknown     rash, vomit       Latex Unknown     Morphine Unknown       Past Medical History:   Diagnosis Date     Ehrlichiosis chafeensis (E. chafeensis)     Created by Conversion         Review of Systems  ROS:  Specifically negative for bowel/bladder dysfunction, balance changes, headache, dizziness, foot drop, fevers, chills, appetite changes, nausea/vomiting, unexplained weight loss. Otherwise 13 systems reviewed are negative. Please see the patient's intake questionnaire from today for details.    Reviewed Social, Family, Past Medical and Past Surgical history with patient, no significant changes noted since prior visit.     Objective:     /64 (BP Location: Right arm, Patient Position: Sitting)   Ht 5' 2\" (1.575 m)   Wt 145 lb (65.8 kg)   BMI 26.52 kg/m      PHYSICAL EXAMINATION:    --CONSTITUTIONAL: Well developed, well nourished, healthy appearing individual.  --PSYCHIATRIC: Appropriate mood and affect. No difficulty interacting due to temper, social withdrawal, or memory issues.  --SKIN: Lumbar region is dry and intact.   --RESPIRATORY: Normal rhythm and effort. No abnormal accessory muscle breathing patterns noted.   --MUSCULOSKELETAL:  Normal lumbar lordosis noted, no lateral shift.  --GROSS MOTOR: Easily arises from a seated position. Gait is non-antalgic  --LUMBAR SPINE:  Inspection reveals no evidence of deformity. Range of motion is not limited in lumbar flexion, extension, lateral rotation. No tenderness to palpation lumbar spine.  Tenderness over right lateral hip/greater trochanteric bursa region.  --LOWER " EXTREMITY MOTOR TESTING:  Plantar flexion left 5/5, right 5/5   Dorsiflexion left 5/5, right 5/5   Great toe MTP extension left 5/5, right 5/5  Knee flexion left 5/5, right 5/5  Knee extension left 5/5, right 5/5   Hip flexion left 5/5, right 5/5  Hip abduction left 5/5, right 5/5  Hip adduction left 5/5, right 5/5   --HIPS: Full range of motion bilaterally.   --NEUROLOGIC: Bilateral patellar and achilles reflexes are physiologic and symmetric. Sensation to light touch is intact in the bilateral L4, L5, and S1 dermatomes.    RESULTS:   Imaging: Spine imaging was reviewed today. The images were shown to the patient and the findings were explained using a spine model.      Lumbar spine x-ray reviewed.                      Again, thank you for allowing me to participate in the care of your patient.        Sincerely,        aTnia Renteria, CNP

## 2023-05-09 ENCOUNTER — HOSPITAL ENCOUNTER (OUTPATIENT)
Dept: PHYSICAL THERAPY | Facility: CLINIC | Age: 53
Discharge: HOME | End: 2023-05-09
Attending: NURSE PRACTITIONER
Payer: COMMERCIAL

## 2023-05-09 DIAGNOSIS — M54.12 LEFT CERVICAL RADICULOPATHY: ICD-10-CM

## 2023-05-09 DIAGNOSIS — G89.29 CHRONIC BILATERAL LOW BACK PAIN WITHOUT SCIATICA: ICD-10-CM

## 2023-05-09 DIAGNOSIS — M54.50 CHRONIC BILATERAL LOW BACK PAIN WITHOUT SCIATICA: ICD-10-CM

## 2023-05-09 DIAGNOSIS — M51.369 DDD (DEGENERATIVE DISC DISEASE), LUMBAR: ICD-10-CM

## 2023-05-09 DIAGNOSIS — M70.61 GREATER TROCHANTERIC BURSITIS OF RIGHT HIP: ICD-10-CM

## 2023-05-09 PROCEDURE — 97110 THERAPEUTIC EXERCISES: CPT | Mod: GP | Performed by: PHYSICAL THERAPIST

## 2023-05-09 PROCEDURE — 97161 PT EVAL LOW COMPLEX 20 MIN: CPT | Mod: GP | Performed by: PHYSICAL THERAPIST

## 2023-05-09 NOTE — PROGRESS NOTES
Lumbar MedX Initial testing    AROM (full=  0-72  lumbar)    Max Extension Torque     Flex: ext ratio (ideal 1.4:1)        Cervical MedX Initial testing  5/9/23   AROM (full= 0-120  cervical) 6-96   Max Extension Torque  113   Flex: ext ratio (ideal 1.4:1) 1.60:1     Date 5/9/23   Lumbar Parameters    Top Dead Center (TDC)    Counterbalance (CB)    Seat Pad    Femur Restraint    Week/Visit    Enter Week/Visit # Wk 1 V 1   Weight (lbs)    Reps (#)    Time    ROM (degrees)    Pain    Flex:Ext ratio    Cervical Parameters    Top Dead Center (TDC) 45   Counterbalance (CB) 0.7   Seat Height 445  #1 seat pad   Week/Visit    Enter Week/Visit # Wk 1 v 1   Weight (lbs) --   Reps (#) --   Time    ROM (degrees) 6-96   Pain    Flex:Ext ratio      Date 5/9/23   Exercise    Stretches: UT, LS, scalene, rhomboid Hold 30 seconds X 1-3 reps   Chin tuck and scapular retraction Hold 10 seconds x 6-10 reps                                             Current stretches: pectoralis stretch, triceps,   Has done chin tuck and other neck stretches in past

## 2023-05-11 ENCOUNTER — HOSPITAL ENCOUNTER (OUTPATIENT)
Dept: PHYSICAL THERAPY | Facility: CLINIC | Age: 53
Discharge: HOME | End: 2023-05-11
Payer: COMMERCIAL

## 2023-05-11 DIAGNOSIS — G89.29 CHRONIC BILATERAL LOW BACK PAIN WITHOUT SCIATICA: ICD-10-CM

## 2023-05-11 DIAGNOSIS — M54.50 CHRONIC BILATERAL LOW BACK PAIN WITHOUT SCIATICA: ICD-10-CM

## 2023-05-11 DIAGNOSIS — M54.12 LEFT CERVICAL RADICULOPATHY: ICD-10-CM

## 2023-05-11 DIAGNOSIS — M70.61 GREATER TROCHANTERIC BURSITIS OF RIGHT HIP: Primary | ICD-10-CM

## 2023-05-11 PROCEDURE — 97110 THERAPEUTIC EXERCISES: CPT | Mod: GP

## 2023-05-11 NOTE — PROGRESS NOTES
Subjective: Patient reports no change in her pain after initial evaluation. Her biggest back complaint is pain after waking.       Assessment: Patient reports no changes since evaluation. This morning she is having a good day. Lumbar extension MedX isometric test performed. Patient presents with a poor flexion to extension ratio. Patient reported some soreness at the end of the set which is not consistent with her normal soreness. Cervical extension MedX patient reported exercise was too light. Patient was educated on biopsychosocial model of pain. Patient demonstrated understanding. Lumbar mobility exercises were reviewed and patient was instructed to perform more frequently throughout the day to prevent soreness. Continue to progress strengthening as tolerated to improve function.     Lumbar MedX Initial testing    AROM (full=  0-72  lumbar) 0-48   Max Extension Torque  104   Flex: ext ratio (ideal 1.4:1) 6.19:1       Cervical MedX Initial testing  5/9/23   AROM (full= 0-120  cervical) 6-96   Max Extension Torque  113   Flex: ext ratio (ideal 1.4:1) 1.60:1     Date 5/11/2023 5/9/23   Lumbar Parameters     Top Dead Center (TDC) 21    Counterbalance (CB) 200    Seat Pad 2    Femur Restraint 6    Week/Visit     Enter Week/Visit # 1/2 Wk 1 V 1   Weight (lbs) 46    Reps (#) 25    Time 268    ROM (degrees) 0-48    Pain 0/10    Flex:Ext ratio     Cervical Parameters     Top Dead Center (TDC) 45 45   Counterbalance (CB) 0.7 0.7   Seat Height 445  #1 seat pad 445  #1 seat pad   Week/Visit     Enter Week/Visit # 1/2 Wk 1 v 1   Weight (lbs) 69 --   Reps (#) 31 --   Time -    ROM (degrees) 6-96 6-96   Pain 0/10    Flex:Ext ratio       Date 5/11/2023 5/9/23   Exercise     Stretches: UT, LS, scalene, rhomboid  Hold 30 seconds X 1-3 reps   Chin tuck and scapular retraction  Hold 10 seconds x 6-10 reps   Lumbar mobility routine (Hooklying lumbar rotation, supine piriformis stretch, open book, cat cow, quadruped thoracic rotation,  prakash pose) x8 min                                                    Current stretches: pectoralis stretch, triceps,   Has done chin tuck and other neck stretches in past          Moises Frey, PT on 5/11/2023 at 8:50 AM

## 2023-05-16 ENCOUNTER — HOSPITAL ENCOUNTER (OUTPATIENT)
Dept: PHYSICAL THERAPY | Facility: CLINIC | Age: 53
Discharge: HOME | End: 2023-05-16
Payer: COMMERCIAL

## 2023-05-16 DIAGNOSIS — M70.61 GREATER TROCHANTERIC BURSITIS OF RIGHT HIP: Primary | ICD-10-CM

## 2023-05-16 DIAGNOSIS — G89.29 CHRONIC BILATERAL LOW BACK PAIN WITHOUT SCIATICA: ICD-10-CM

## 2023-05-16 DIAGNOSIS — M54.50 CHRONIC BILATERAL LOW BACK PAIN WITHOUT SCIATICA: ICD-10-CM

## 2023-05-16 DIAGNOSIS — M54.12 LEFT CERVICAL RADICULOPATHY: ICD-10-CM

## 2023-05-16 DIAGNOSIS — M51.369 DDD (DEGENERATIVE DISC DISEASE), LUMBAR: ICD-10-CM

## 2023-05-16 PROCEDURE — 97110 THERAPEUTIC EXERCISES: CPT | Mod: GP | Performed by: PHYSICAL THERAPIST

## 2023-05-16 NOTE — PROGRESS NOTES
Assessment: Patient returns for her second week of MedX and has not noticed a change yet, but is still very early in the program.  Today she had more cervical AROM in flex and ext with dynamic ex in MedX and did a big weight jump.  She did not experience any pain in any of the MedX with ex today. Pt continues to be a good candidate for MedX therapy to decrease pain levels and improve functional abilities.     Lumbar MedX Initial testing   5/11/23   AROM (full=  0-72  lumbar) 0-48   Max Extension Torque  104   Flex: ext ratio (ideal 1.4:1) 6.19:1       Cervical MedX Initial testing  5/9/23   AROM (full= 0-120  cervical) 6-96   Max Extension Torque  113   Flex: ext ratio (ideal 1.4:1) 1.60:1     Date 5/16/23 5/11/2023 5/9/23   Lumbar Parameters      Top Dead Center (TDC) 21 21    Counterbalance (CB) 2009 200    Seat Pad 2 2    Femur Restraint 6 6    Week/Visit      Enter Week/Visit # Wk 2 V 2 1/2 Wk 1 V 1   Weight (lbs) 49# 46    Reps (#) 30 25    Time 223s 268    ROM (degrees) 0-48 0-48    Pain Stiff, no pain 0/10    Flex:Ext ratio      Cervical Parameters      Top Dead Center (TDC) 45 45 45   Counterbalance (CB) 0.7 0.7 0.7   Seat Height 445  #1 seat pad 445  #1 seat pad 445  #1 seat pad   Week/Visit      Enter Week/Visit # Wk 2 V 1 1/2 Wk 1 v 1   Weight (lbs) 114# 69 --   Reps (#) 30 31 --   Time -- -    ROM (degrees) 0-108 6-96 6-96   Pain  0/10    Flex:Ext ratio        Date 5/16/23 5/11/2023 5/9/23   Exercise      Nu-step 3 min     Rotary torso  90 seconds, B 26# to L     Stretches: UT, LS, scalene, rhomboid   Hold 30 seconds X 1-3 reps   Chin tuck and scapular retraction   Hold 10 seconds x 6-10 reps   Lumbar mobility routine (Hooklying lumbar rotation, supine piriformis stretch, open book, cat cow, quadruped thoracic rotation, prakash pose)  x8 min     Quadratus lumborum stretch Hold 30 seconds X 1-3 reps                                                       Current stretches: pectoralis stretch, triceps,    Has done chin tuck and other neck stretches in past      Sobia Nunes, PT

## 2023-05-24 ASSESSMENT — ENCOUNTER SYMPTOMS
DYSURIA: 0
JOINT SWELLING: 0
SORE THROAT: 0
COUGH: 0
WEAKNESS: 0
MYALGIAS: 1
CONSTIPATION: 0
NERVOUS/ANXIOUS: 0
PALPITATIONS: 0
HEARTBURN: 0
HEADACHES: 0
ARTHRALGIAS: 1
HEMATURIA: 0
SHORTNESS OF BREATH: 0
DIARRHEA: 0
PARESTHESIAS: 0
CHILLS: 0
HEMATOCHEZIA: 0
ABDOMINAL PAIN: 0
FEVER: 0
FREQUENCY: 0
EYE PAIN: 0
NAUSEA: 0
DIZZINESS: 0
BREAST MASS: 0

## 2023-05-25 NOTE — ADDENDUM NOTE
Encounter addended by: Sobia Nunes, PT on: 5/25/2023 9:25 AM   Actions taken: Episode resolved, Clinical Note Signed, Flowsheet accepted

## 2023-05-25 NOTE — PROGRESS NOTES
DISCHARGE  Reason for Discharge: Patient chooses to discontinue therapy due to financial reasons.      Equipment Issued:     Discharge Plan: Patient to continue home program.    Referring Provider:  Tania Renteria        05/16/23 0700   Appointment Info   Signing clinician's name / credentials Sobia Nunes, PT   Visits Used 3/16, MedX   Subjective Report   Subjective Report No significant changes since starting therapy.  Neck stretches are going okay.   Objective Measures   Objective Measures Objective Measure 1;Objective Measure 2   Objective Measure 1   Objective Measure VANDANA: 44 on 5/9/23   Objective Measure 2   Objective Measure pain: 4/10, neck   Treatment Interventions (PT)   Interventions Therapeutic Procedure/Exercise   Therapeutic Procedure/Exercise   Therapeutic Procedures: strength, endurance, ROM, flexibillity minutes (38931) 25   Education   Learner/Method Patient   Plan   Homework PTRx [id number]   Plan for next session MedX for lumbar and cervicval, add thread the needle, core HEP   Comments   Comments Assessment: Patient returns for her second week of MedX and has not noticed a change yet, but is still very early in the program.  Today she had more cervical AROM in flex and ext with dynamic ex in MedX and did a big weight jump.  She did not experience any pain in any of the MedX with ex today. Pt continues to be a good candidate for MedX therapy to decrease pain levels and improve functional abilities.   Medicare Claim Information   Medical Diagnosis Chronic Cervical, Lumbar and R Hip Bursitis   PT Diagnosis weakness, chronic cervical, lumbar and R hip pain   Start of Care Date 05/09/23   Onset date of current episode/exacerbation 05/04/23   Ortho Goal 1   Goal Identifier walk   Goal Description Pt will be able to walk her 2 miles for exercise with no increasing pain as strength improves   Target Date 08/06/23   Ortho Goal 2   Goal Identifier stairs/hills   Goal Description Pt will be able  to ambulate up and down stairs with step through pattern and walk hills with more ease and less reported pain levels of 0-2/10 as VANDANA improves by 10 points   Target Date 08/06/23   Session Number   Additional Session Number IE: 5/9/23

## 2023-05-26 ENCOUNTER — RADIOLOGY INJECTION OFFICE VISIT (OUTPATIENT)
Dept: PHYSICAL MEDICINE AND REHAB | Facility: CLINIC | Age: 53
End: 2023-05-26
Attending: NURSE PRACTITIONER
Payer: COMMERCIAL

## 2023-05-26 VITALS
TEMPERATURE: 98.7 F | OXYGEN SATURATION: 98 % | DIASTOLIC BLOOD PRESSURE: 63 MMHG | SYSTOLIC BLOOD PRESSURE: 139 MMHG | HEART RATE: 73 BPM

## 2023-05-26 DIAGNOSIS — M70.61 GREATER TROCHANTERIC BURSITIS OF RIGHT HIP: ICD-10-CM

## 2023-05-26 PROCEDURE — 20611 DRAIN/INJ JOINT/BURSA W/US: CPT | Mod: RT | Performed by: PAIN MEDICINE

## 2023-05-26 RX ORDER — METHYLPREDNISOLONE ACETATE 40 MG/ML
INJECTION, SUSPENSION INTRA-ARTICULAR; INTRALESIONAL; INTRAMUSCULAR; SOFT TISSUE
Status: COMPLETED | OUTPATIENT
Start: 2023-05-26 | End: 2023-05-26

## 2023-05-26 RX ORDER — LIDOCAINE HYDROCHLORIDE 10 MG/ML
INJECTION, SOLUTION EPIDURAL; INFILTRATION; INTRACAUDAL; PERINEURAL
Status: COMPLETED | OUTPATIENT
Start: 2023-05-26 | End: 2023-05-26

## 2023-05-26 RX ADMIN — METHYLPREDNISOLONE ACETATE 20 MG: 40 INJECTION, SUSPENSION INTRA-ARTICULAR; INTRALESIONAL; INTRAMUSCULAR; SOFT TISSUE at 10:08

## 2023-05-26 RX ADMIN — LIDOCAINE HYDROCHLORIDE 1 ML: 10 INJECTION, SOLUTION EPIDURAL; INFILTRATION; INTRACAUDAL; PERINEURAL at 10:06

## 2023-05-26 ASSESSMENT — PAIN SCALES - GENERAL: PAINLEVEL: MODERATE PAIN (4)

## 2023-05-26 NOTE — PATIENT INSTRUCTIONS
DISCHARGE INSTRUCTIONS    During office hours (8:00 a.m.- 4:00 p.m.) questions or concerns may be answered  by calling [facility] Navigation Nurses at  [phone].  Messages received after hours will be returned the following business day.      In the case of an emergency, please dial 911 or seek assistance at the nearest Emergency Room/Urgent Care facility.     All Patients:    You may experience an increase in your symptoms for the first 2 days (It may take anywhere between 2 days- 2 weeks for the steroid to have maximum effect).    You may use ice on the injection site, as frequently as 20 minutes each hour if needed.    You may take your pain medicine.    You may continue taking your regular medication after your injection. If you have had a Medial Branch Block you may resume pain medication once your pain diary is completed.    You may shower. No swimming, tub bath or hot tub for 48 hours.  You may remove your bandaid/bandage as soon as you are home.    You may resume light activities, as tolerated.    Resume your usual diet as tolerated.    It is strongly advised that you do not drive for 1-3 hours post injection.    If you have had oral sedation:  Do not drive for 8 hours post injection.      If you have had IV sedation:  Do not drive for 24 hours post injection.  Do not operate hazardous machinery or make important personal/business decisions for 24 hours.      POSSIBLE STEROID SIDE EFFECTS (If steroid/cortisone was used for your procedure)    -If you experience these symptoms, it should only last for a short period    Swelling of the legs              Skin redness (flushing)     Mouth (oral) irritation   Blood sugar (glucose) levels            Sweats                    Mood changes  Headache  Sleeplessness  Weakened immune system for up to 14 days, which could increase the risk of erik the COVID-19 virus and/or experiencing more severe symptoms of the disease, if exposed.  Decreased  effectiveness of the flu vaccine if given within 2 weeks of the steroid.         POSSIBLE PROCEDURE SIDE EFFECTS  -Call the Spine Center if you are concerned  Increased Pain           Increased numbness/tingling      Nausea/Vomiting          Bruising/bleeding at site      Redness or swelling                                              Difficulty walking      Weakness           Fever greater than 100.5    *In the event of a severe headache after an epidural steroid injection that is relieved by lying down, please call the [facility] to speak with a clinical staff member*

## 2023-05-31 ENCOUNTER — OFFICE VISIT (OUTPATIENT)
Dept: FAMILY MEDICINE | Facility: CLINIC | Age: 53
End: 2023-05-31
Payer: COMMERCIAL

## 2023-05-31 ENCOUNTER — LAB (OUTPATIENT)
Dept: FAMILY MEDICINE | Facility: CLINIC | Age: 53
End: 2023-05-31

## 2023-05-31 VITALS
WEIGHT: 152.38 LBS | BODY MASS INDEX: 28.04 KG/M2 | DIASTOLIC BLOOD PRESSURE: 66 MMHG | OXYGEN SATURATION: 100 % | HEART RATE: 80 BPM | RESPIRATION RATE: 16 BRPM | HEIGHT: 62 IN | SYSTOLIC BLOOD PRESSURE: 128 MMHG

## 2023-05-31 DIAGNOSIS — L98.9 SKIN LESION: ICD-10-CM

## 2023-05-31 DIAGNOSIS — E01.0 THYROMEGALY: ICD-10-CM

## 2023-05-31 DIAGNOSIS — Z12.31 VISIT FOR SCREENING MAMMOGRAM: ICD-10-CM

## 2023-05-31 DIAGNOSIS — Z00.00 ENCOUNTER FOR PREVENTATIVE ADULT HEALTH CARE EXAMINATION: Primary | ICD-10-CM

## 2023-05-31 DIAGNOSIS — Z79.899 MEDICATION MANAGEMENT: ICD-10-CM

## 2023-05-31 DIAGNOSIS — Z12.11 SCREEN FOR COLON CANCER: ICD-10-CM

## 2023-05-31 LAB
ERYTHROCYTE [DISTWIDTH] IN BLOOD BY AUTOMATED COUNT: 11.8 % (ref 10–15)
HCT VFR BLD AUTO: 40.6 % (ref 35–47)
HGB BLD-MCNC: 13.9 G/DL (ref 11.7–15.7)
MCH RBC QN AUTO: 31.3 PG (ref 26.5–33)
MCHC RBC AUTO-ENTMCNC: 34.2 G/DL (ref 31.5–36.5)
MCV RBC AUTO: 91 FL (ref 78–100)
PLATELET # BLD AUTO: 302 10E3/UL (ref 150–450)
RBC # BLD AUTO: 4.44 10E6/UL (ref 3.8–5.2)
WBC # BLD AUTO: 6.2 10E3/UL (ref 4–11)

## 2023-05-31 PROCEDURE — 80061 LIPID PANEL: CPT | Performed by: FAMILY MEDICINE

## 2023-05-31 PROCEDURE — 80053 COMPREHEN METABOLIC PANEL: CPT | Performed by: FAMILY MEDICINE

## 2023-05-31 PROCEDURE — 85027 COMPLETE CBC AUTOMATED: CPT | Performed by: FAMILY MEDICINE

## 2023-05-31 PROCEDURE — 36415 COLL VENOUS BLD VENIPUNCTURE: CPT | Performed by: FAMILY MEDICINE

## 2023-05-31 PROCEDURE — 99396 PREV VISIT EST AGE 40-64: CPT | Performed by: FAMILY MEDICINE

## 2023-05-31 PROCEDURE — 82306 VITAMIN D 25 HYDROXY: CPT | Performed by: FAMILY MEDICINE

## 2023-05-31 PROCEDURE — 84443 ASSAY THYROID STIM HORMONE: CPT | Performed by: FAMILY MEDICINE

## 2023-05-31 RX ORDER — SUMATRIPTAN 50 MG/1
TABLET, FILM COATED ORAL
Qty: 12 TABLET | Refills: 3 | Status: SHIPPED | OUTPATIENT
Start: 2023-05-31 | End: 2023-12-11

## 2023-05-31 ASSESSMENT — ENCOUNTER SYMPTOMS
SORE THROAT: 0
FREQUENCY: 0
CONSTIPATION: 0
FEVER: 0
HEMATURIA: 0
NERVOUS/ANXIOUS: 0
BREAST MASS: 0
PARESTHESIAS: 0
DIARRHEA: 0
DIZZINESS: 0
EYE PAIN: 0
ARTHRALGIAS: 1
HEADACHES: 0
MYALGIAS: 1
CHILLS: 0
COUGH: 0
DYSURIA: 0
JOINT SWELLING: 0
WEAKNESS: 0
NAUSEA: 0
PALPITATIONS: 0
HEARTBURN: 0
ABDOMINAL PAIN: 0
HEMATOCHEZIA: 0
SHORTNESS OF BREATH: 0

## 2023-05-31 NOTE — PROGRESS NOTES
SUBJECTIVE:   CC: Frances is an 52 year old who presents for preventive health visit.       2023     2:01 PM   Additional Questions   Roomed by Catarina POWER   Accompanied by na     Healthy Habits:    Getting at least 3 servings of Calcium per day:  Yes    Bi-annual eye exam:  Yes    Dental care twice a year:  Yes    Sleep apnea or symptoms of sleep apnea:  None    Diet:  Carbohydrate counting    Frequency of exercise:  4-5 days/week    Duration of exercise:  30-45 minutes    Taking medications regularly:  Yes    Medication side effects:  None    PHQ-2 Total Score:    Additional concerns today:  No    No concerns today.    Health Maintenance   Topic Date Due     HIV SCREENING  NA     YEARLY PREVENTIVE VISIT  Today     INFLUENZA VACCINE (1) Fall     DTAP/TDAP/TD IMMUNIZATION (3 - Td or Tdap) 2022, will get  or after     COLORECTAL CANCER SCREENING  2023, ordered Cologaurd     ANNUAL REVIEW OF HM ORDERS  Today     MAMMO SCREENING  2023, ordered     HPV TEST  Next year     PAP  Next year     LIPID  Today     ADVANCE CARE PLANNING  Has packet at home     HEPATITIS C SCREENING  Completed     PHQ-2 (once per calendar year)  Completed     ZOSTER IMMUNIZATION  Completed     HEPATITIS B IMMUNIZATION  Completed     COVID-19 Vaccine  Completed     Pneumococcal Vaccine: Pediatrics (0 to 5 Years) and At-Risk Patients (6 to 64 Years)  Age 65     IPV IMMUNIZATION  Aged Out     MENINGITIS IMMUNIZATION  Aged Out     Dexa scan:  Due                   Social History     Tobacco Use     Smoking status: Never     Smokeless tobacco: Never   Vaping Use     Vaping status: Not on file   Substance Use Topics     Alcohol use: Not on file             2023     2:11 PM   Alcohol Use   Prescreen: >3 drinks/day or >7 drinks/week? No     Reviewed orders with patient.  Reviewed health maintenance and updated orders accordingly - Yes  Lab work is in process    Breast Cancer Screenin/24/2023     2:12  PM   Breast CA Risk Assessment (FHS-7)   Do you have a family history of breast, colon, or ovarian cancer? No / Unknown         Mammogram Screening: Recommended annual mammography  Pertinent mammograms are reviewed under the imaging tab.    History of abnormal Pap smear: NO - age 30-65 PAP every 3- 5 years with negative HPV co-testing recommended      Latest Ref Rng & Units 1/16/2020    10:31 AM 11/11/2015    10:21 AM   PAP / HPV   PAP Negative for squamous intraepithelial lesion or malignancy. Negative for squamous intraepithelial lesion or malignancy  Electronically signed by Mary Anne Wong CT (ASCP) on 1/23/2020 at 10:28 AM     Negative for squamous intraepithelial lesion or malignancy  Electronically signed by Yolanda Farris CT (ASCP) on 11/23/2015 at  1:03 PM       HPV 16 DNA NEG Negative      HPV 18 DNA NEG Negative      Other HR HPV NEG Negative        Reviewed and updated as needed this visit by clinical staff   Tobacco  Allergies  Meds              Reviewed and updated as needed this visit by Provider                     Review of Systems   Constitutional: Negative for chills and fever.   HENT: Negative for congestion, ear pain, hearing loss and sore throat.    Eyes: Negative for pain and visual disturbance.   Respiratory: Negative for cough and shortness of breath.    Cardiovascular: Negative for chest pain, palpitations and peripheral edema.   Gastrointestinal: Negative for abdominal pain, constipation, diarrhea, heartburn, hematochezia and nausea.   Breasts:  Negative for tenderness, breast mass and discharge.   Genitourinary: Negative for dysuria, frequency, genital sores, hematuria, pelvic pain, urgency, vaginal bleeding and vaginal discharge.   Musculoskeletal: Positive for arthralgias and myalgias. Negative for joint swelling.   Skin: Negative for rash.   Neurological: Negative for dizziness, weakness, headaches and paresthesias.   Psychiatric/Behavioral: Negative for mood changes. The  "patient is not nervous/anxious.           OBJECTIVE:   /66 (BP Location: Left arm, Patient Position: Sitting, Cuff Size: Adult Regular)   Pulse 80   Resp 16   Ht 1.568 m (5' 1.75\")   Wt 69.1 kg (152 lb 6 oz)   SpO2 100%   BMI 28.10 kg/m    Physical Exam  GENERAL: healthy, alert and no distress  EYES: Eyes grossly normal to inspection, PERRL and conjunctivae and sclerae normal  HENT: ear canals and TM's normal, nose and mouth without ulcers or lesions  NECK: no adenopathy, no asymmetry, masses, or scars and thyroid normal to palpation  RESP: lungs clear to auscultation - no rales, rhonchi or wheezes  BREAST: normal without masses, tenderness or nipple discharge and no palpable axillary masses or adenopathy  CV: regular rate and rhythm, normal S1 S2, no S3 or S4, no murmur, click or rub, no peripheral edema and peripheral pulses strong  ABDOMEN: soft, nontender, no hepatosplenomegaly, no masses and bowel sounds normal  MS: no gross musculoskeletal defects noted, no edema  SKIN: no suspicious lesions or rashes, except a firm nodule on each anterior shin about 7 mm each, but full body skin check not perfomed  NEURO: Normal strength and tone, mentation intact and speech normal  PSYCH: mentation appears normal, affect normal/bright    Lump each anterior shin, firm.    Diagnostic Test Results:  Labs reviewed in Epic    ASSESSMENT/PLAN:       ICD-10-CM    1. Encounter for preventative adult health care examination  Z00.00 Lipid Profile     Lipid Profile      2. Screen for colon cancer  Z12.11 COLOGUARD(EXACT SCIENCES)      3. Medication management  Z79.899 SUMAtriptan (IMITREX) 50 MG tablet      4. Thyromegaly  E01.0 Comprehensive metabolic panel     CBC with platelets     TSH with free T4 reflex     Vitamin D Deficiency     Comprehensive metabolic panel     CBC with platelets     TSH with free T4 reflex     Vitamin D Deficiency      5. Visit for screening mammogram  Z12.31 MA Screen Bilateral w/Jose Miguel      6. " "Skin lesion  L98.9 Adult Dermatology Referral        We want 2 weeks with any vaccine from a cortisone injection.  You can have immunizations with a nurse appointment June 9 or after.  Tdap.    Dermatology consult given for full body skin check and point out the lumps on both legs.    Patient had a thyroid ultrasound in 2020 without any concerning abnormality.    Follows with Ortho for her musculoskeletal concerns.    Patient has been advised of split billing requirements and indicates understanding: Yes      COUNSELING:  Reviewed preventive health counseling, as reflected in patient instructions       Regular exercise       Healthy diet/nutrition      BMI:   Estimated body mass index is 28.1 kg/m  as calculated from the following:    Height as of this encounter: 1.568 m (5' 1.75\").    Weight as of this encounter: 69.1 kg (152 lb 6 oz).   Weight management plan: Discussed healthy diet and exercise guidelines      She reports that she has never smoked. She has never used smokeless tobacco.      Anamaria Charlton MD  [facility]  "

## 2023-05-31 NOTE — PATIENT INSTRUCTIONS
We want 2 weeks with any vaccine from a cortisone injection.  You can have immunizations with a nurse appointment June 9 or after.  Tdap.    Dermatology consult given for full body skin check and point out the lumps on both legs.      Health Maintenance   Topic Date Due    HIV SCREENING  NA    YEARLY PREVENTIVE VISIT  Today    INFLUENZA VACCINE (1) Fall    DTAP/TDAP/TD IMMUNIZATION (3 - Td or Tdap) 09/20/2022, will get Staci 9 or after    COLORECTAL CANCER SCREENING  02/11/2023, ordered Cologaurd    ANNUAL REVIEW OF HM ORDERS  Today    MAMMO SCREENING  09/06/2023, ordered    HPV TEST  Next year    PAP  Next year    LIPID  Today    ADVANCE CARE PLANNING  Has packet at home    HEPATITIS C SCREENING  Completed    PHQ-2 (once per calendar year)  Completed    ZOSTER IMMUNIZATION  Completed    HEPATITIS B IMMUNIZATION  Completed    COVID-19 Vaccine  Completed    Pneumococcal Vaccine: Pediatrics (0 to 5 Years) and At-Risk Patients (6 to 64 Years)  Age 65    IPV IMMUNIZATION  Aged Out    MENINGITIS IMMUNIZATION  Aged Out     Dexa scan:  Due 2025

## 2023-06-01 LAB
ALBUMIN SERPL BCG-MCNC: 4.6 G/DL (ref 3.5–5.2)
ALP SERPL-CCNC: 92 U/L (ref 35–104)
ALT SERPL W P-5'-P-CCNC: 23 U/L (ref 10–35)
ANION GAP SERPL CALCULATED.3IONS-SCNC: 13 MMOL/L (ref 7–15)
AST SERPL W P-5'-P-CCNC: 23 U/L (ref 10–35)
BILIRUB SERPL-MCNC: 0.5 MG/DL
BUN SERPL-MCNC: 18.5 MG/DL (ref 6–20)
CALCIUM SERPL-MCNC: 10 MG/DL (ref 8.6–10)
CHLORIDE SERPL-SCNC: 100 MMOL/L (ref 98–107)
CHOLEST SERPL-MCNC: 211 MG/DL
CREAT SERPL-MCNC: 0.75 MG/DL (ref 0.51–0.95)
DEPRECATED CALCIDIOL+CALCIFEROL SERPL-MC: 57 UG/L (ref 20–75)
DEPRECATED HCO3 PLAS-SCNC: 25 MMOL/L (ref 22–29)
GFR SERPL CREATININE-BSD FRML MDRD: >90 ML/MIN/1.73M2
GLUCOSE SERPL-MCNC: 100 MG/DL (ref 70–99)
HDLC SERPL-MCNC: 81 MG/DL
LDLC SERPL CALC-MCNC: 86 MG/DL
NONHDLC SERPL-MCNC: 130 MG/DL
POTASSIUM SERPL-SCNC: 4.3 MMOL/L (ref 3.4–5.3)
PROT SERPL-MCNC: 7.4 G/DL (ref 6.4–8.3)
SODIUM SERPL-SCNC: 138 MMOL/L (ref 136–145)
TRIGL SERPL-MCNC: 218 MG/DL
TSH SERPL DL<=0.005 MIU/L-ACNC: 1.69 UIU/ML (ref 0.3–4.2)

## 2023-06-14 ENCOUNTER — ALLIED HEALTH/NURSE VISIT (OUTPATIENT)
Dept: FAMILY MEDICINE | Facility: CLINIC | Age: 53
End: 2023-06-14
Payer: COMMERCIAL

## 2023-06-14 DIAGNOSIS — Z23 ENCOUNTER FOR IMMUNIZATION: Primary | ICD-10-CM

## 2023-06-14 LAB — NONINV COLON CA DNA+OCC BLD SCRN STL QL: NEGATIVE

## 2023-06-14 PROCEDURE — 90471 IMMUNIZATION ADMIN: CPT

## 2023-06-14 PROCEDURE — 99207 PR NO CHARGE NURSE ONLY: CPT

## 2023-06-14 PROCEDURE — 90715 TDAP VACCINE 7 YRS/> IM: CPT

## 2023-06-14 NOTE — PROGRESS NOTES
Prior to immunization administration, verified patients identity using patient s name and date of birth. Please see Immunization Activity for additional information.     Screening Questionnaire for Adult Immunization    Are you sick today?   No   Do you have allergies to medications, food, a vaccine component or latex?   Yes   Have you ever had a serious reaction after receiving a vaccination?   No   Do you have a long-term health problem with heart, lung, kidney, or metabolic disease (e.g., diabetes), asthma, a blood disorder, no spleen, complement component deficiency, a cochlear implant, or a spinal fluid leak?  Are you on long-term aspirin therapy?   No   Do you have cancer, leukemia, HIV/AIDS, or any other immune system problem?   No   Do you have a parent, brother, or sister with an immune system problem?   No   In the past 3 months, have you taken medications that affect  your immune system, such as prednisone, other steroids, or anticancer drugs; drugs for the treatment of rheumatoid arthritis, Crohn s disease, or psoriasis; or have you had radiation treatments?   Yes, cortisone inj 5/26/23   Have you had a seizure, or a brain or other nervous system problem?   No   During the past year, have you received a transfusion of blood or blood    products, or been given immune (gamma) globulin or antiviral drug?   No   For women: Are you pregnant or is there a chance you could become       pregnant during the next month?   No   Have you received any vaccinations in the past 4 weeks?   No     Immunization questionnaire was positive for at least one answer.  Notified Dr Charlton.    I have reviewed the following standing orders:   This patient is due and qualifies for a TDAP vaccine.    Click here for Tdap Standing Order    I have reviewed the vaccines inclusion and exclusion criteria; No concerns regarding eligibility.         Patient instructed to remain in clinic for 15 minutes afterwards, and to report any adverse  reactions.     Screening performed by Marti Silva on 6/14/2023 at 2:19 PM.

## 2023-07-13 ENCOUNTER — TRANSFERRED RECORDS (OUTPATIENT)
Dept: HEALTH INFORMATION MANAGEMENT | Facility: CLINIC | Age: 53
End: 2023-07-13
Payer: COMMERCIAL

## 2023-08-07 ENCOUNTER — PATIENT OUTREACH (OUTPATIENT)
Dept: CARE COORDINATION | Facility: CLINIC | Age: 53
End: 2023-08-07
Payer: COMMERCIAL

## 2023-09-07 ENCOUNTER — ANCILLARY PROCEDURE (OUTPATIENT)
Dept: MAMMOGRAPHY | Facility: CLINIC | Age: 53
End: 2023-09-07
Attending: FAMILY MEDICINE
Payer: COMMERCIAL

## 2023-09-07 DIAGNOSIS — Z12.31 VISIT FOR SCREENING MAMMOGRAM: ICD-10-CM

## 2023-09-07 PROCEDURE — 77067 SCR MAMMO BI INCL CAD: CPT

## 2023-12-09 DIAGNOSIS — Z79.899 MEDICATION MANAGEMENT: ICD-10-CM

## 2023-12-11 RX ORDER — SUMATRIPTAN 50 MG/1
TABLET, FILM COATED ORAL
Qty: 12 TABLET | Refills: 3 | Status: SHIPPED | OUTPATIENT
Start: 2023-12-11 | End: 2024-05-22

## 2024-02-28 ENCOUNTER — E-VISIT (OUTPATIENT)
Dept: FAMILY MEDICINE | Facility: CLINIC | Age: 54
End: 2024-02-28
Payer: COMMERCIAL

## 2024-02-28 DIAGNOSIS — J32.9 SINUSITIS, UNSPECIFIED CHRONICITY, UNSPECIFIED LOCATION: Primary | ICD-10-CM

## 2024-02-28 PROCEDURE — 99421 OL DIG E/M SVC 5-10 MIN: CPT | Performed by: FAMILY MEDICINE

## 2024-02-28 NOTE — PATIENT INSTRUCTIONS
Thank you for choosing us for your care. I have placed an order for a prescription so that you can start treatment. View your full visit summary for details by clicking on the link below. Your pharmacist will able to address any questions you may have about the medication.     If you're not feeling better within 5-7 days, please schedule an appointment.  You can schedule an appointment right here in [facility], or call [phone]  If the visit is for the same symptoms as your eVisit, we'll refund the cost of your eVisit if seen within seven days.

## 2024-02-28 NOTE — LETTER
Frances Mane  1970 2-28-24        To whom it may concern:    This patient has missed 2 days of work secondary to sinusitis.      Anamaria Charlton MD

## 2024-02-29 ENCOUNTER — MYC MEDICAL ADVICE (OUTPATIENT)
Dept: FAMILY MEDICINE | Facility: CLINIC | Age: 54
End: 2024-02-29
Payer: COMMERCIAL

## 2024-02-29 DIAGNOSIS — J32.9 SINUSITIS, UNSPECIFIED CHRONICITY, UNSPECIFIED LOCATION: Primary | ICD-10-CM

## 2024-02-29 RX ORDER — SULFAMETHOXAZOLE/TRIMETHOPRIM 800-160 MG
1 TABLET ORAL 2 TIMES DAILY
Qty: 14 TABLET | Refills: 0 | Status: SHIPPED | OUTPATIENT
Start: 2024-02-29 | End: 2024-03-07

## 2024-04-23 DIAGNOSIS — R52 PAIN: ICD-10-CM

## 2024-04-23 RX ORDER — CYCLOBENZAPRINE HCL 10 MG
10 TABLET ORAL 3 TIMES DAILY PRN
Qty: 60 TABLET | Refills: 3 | Status: SHIPPED | OUTPATIENT
Start: 2024-04-23

## 2024-05-22 DIAGNOSIS — Z79.899 MEDICATION MANAGEMENT: ICD-10-CM

## 2024-05-22 RX ORDER — SUMATRIPTAN 50 MG/1
TABLET, FILM COATED ORAL
Qty: 12 TABLET | Refills: 3 | Status: SHIPPED | OUTPATIENT
Start: 2024-05-22

## 2024-07-21 ENCOUNTER — HEALTH MAINTENANCE LETTER (OUTPATIENT)
Age: 54
End: 2024-07-21

## 2024-09-24 ENCOUNTER — ANCILLARY PROCEDURE (OUTPATIENT)
Dept: MAMMOGRAPHY | Facility: CLINIC | Age: 54
End: 2024-09-24
Attending: FAMILY MEDICINE
Payer: COMMERCIAL

## 2024-09-24 DIAGNOSIS — Z12.31 VISIT FOR SCREENING MAMMOGRAM: ICD-10-CM

## 2024-09-24 PROCEDURE — 77063 BREAST TOMOSYNTHESIS BI: CPT

## 2024-09-28 SDOH — HEALTH STABILITY: PHYSICAL HEALTH: ON AVERAGE, HOW MANY MINUTES DO YOU ENGAGE IN EXERCISE AT THIS LEVEL?: 40 MIN

## 2024-09-28 SDOH — HEALTH STABILITY: PHYSICAL HEALTH: ON AVERAGE, HOW MANY DAYS PER WEEK DO YOU ENGAGE IN MODERATE TO STRENUOUS EXERCISE (LIKE A BRISK WALK)?: 7 DAYS

## 2024-09-28 ASSESSMENT — SOCIAL DETERMINANTS OF HEALTH (SDOH): HOW OFTEN DO YOU GET TOGETHER WITH FRIENDS OR RELATIVES?: PATIENT DECLINED

## 2024-10-03 ENCOUNTER — OFFICE VISIT (OUTPATIENT)
Dept: FAMILY MEDICINE | Facility: CLINIC | Age: 54
End: 2024-10-03
Payer: COMMERCIAL

## 2024-10-03 VITALS
WEIGHT: 158.8 LBS | BODY MASS INDEX: 29.22 KG/M2 | DIASTOLIC BLOOD PRESSURE: 74 MMHG | RESPIRATION RATE: 16 BRPM | HEART RATE: 88 BPM | SYSTOLIC BLOOD PRESSURE: 106 MMHG | OXYGEN SATURATION: 99 % | HEIGHT: 62 IN

## 2024-10-03 DIAGNOSIS — Z12.4 CERVICAL CANCER SCREENING: ICD-10-CM

## 2024-10-03 DIAGNOSIS — Z13.220 LIPID SCREENING: ICD-10-CM

## 2024-10-03 DIAGNOSIS — Z12.11 SCREEN FOR COLON CANCER: ICD-10-CM

## 2024-10-03 DIAGNOSIS — S16.1XXA REPETITIVE STRAIN INJURY OF NECK, INITIAL ENCOUNTER: ICD-10-CM

## 2024-10-03 DIAGNOSIS — Z78.0 POSTMENOPAUSAL STATUS: ICD-10-CM

## 2024-10-03 DIAGNOSIS — R73.09 ELEVATED GLUCOSE: ICD-10-CM

## 2024-10-03 DIAGNOSIS — K21.9 GASTROESOPHAGEAL REFLUX DISEASE WITHOUT ESOPHAGITIS: ICD-10-CM

## 2024-10-03 DIAGNOSIS — H61.22 IMPACTED CERUMEN OF LEFT EAR: ICD-10-CM

## 2024-10-03 DIAGNOSIS — R25.2 CRAMPS OF LEFT LOWER EXTREMITY: ICD-10-CM

## 2024-10-03 DIAGNOSIS — Z00.00 ROUTINE GENERAL MEDICAL EXAMINATION AT A HEALTH CARE FACILITY: Primary | ICD-10-CM
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
## 2024-10-03 LAB
ALBUMIN SERPL BCG-MCNC: 4.6 G/DL (ref 3.5–5.2)
ALP SERPL-CCNC: 90 U/L (ref 40–150)
ALT SERPL W P-5'-P-CCNC: 17 U/L (ref 0–50)
ANION GAP SERPL CALCULATED.3IONS-SCNC: 10 MMOL/L (ref 7–15)
AST SERPL W P-5'-P-CCNC: 23 U/L (ref 0–45)
BILIRUB SERPL-MCNC: 0.6 MG/DL
BUN SERPL-MCNC: 19.9 MG/DL (ref 6–20)
CALCIUM SERPL-MCNC: 9.7 MG/DL (ref 8.8–10.4)
CHLORIDE SERPL-SCNC: 103 MMOL/L (ref 98–107)
CHOLEST SERPL-MCNC: 203 MG/DL
CREAT SERPL-MCNC: 0.83 MG/DL (ref 0.51–0.95)
EGFRCR SERPLBLD CKD-EPI 2021: 83 ML/MIN/1.73M2
ERYTHROCYTE [DISTWIDTH] IN BLOOD BY AUTOMATED COUNT: 11.6 % (ref 10–15)
EST. AVERAGE GLUCOSE BLD GHB EST-MCNC: 105 MG/DL
FASTING STATUS PATIENT QL REPORTED: YES
FASTING STATUS PATIENT QL REPORTED: YES
GLUCOSE SERPL-MCNC: 99 MG/DL (ref 70–99)
HBA1C MFR BLD: 5.3 % (ref 0–5.6)
HCO3 SERPL-SCNC: 25 MMOL/L (ref 22–29)
HCT VFR BLD AUTO: 42.5 % (ref 35–47)
HDLC SERPL-MCNC: 77 MG/DL
HGB BLD-MCNC: 14.4 G/DL (ref 11.7–15.7)
LDLC SERPL CALC-MCNC: 114 MG/DL
MCH RBC QN AUTO: 31.1 PG (ref 26.5–33)
MCHC RBC AUTO-ENTMCNC: 33.9 G/DL (ref 31.5–36.5)
MCV RBC AUTO: 92 FL (ref 78–100)
NONHDLC SERPL-MCNC: 126 MG/DL
PLATELET # BLD AUTO: 303 10E3/UL (ref 150–450)
POTASSIUM SERPL-SCNC: 4.4 MMOL/L (ref 3.4–5.3)
PROT SERPL-MCNC: 7.7 G/DL (ref 6.4–8.3)
RBC # BLD AUTO: 4.63 10E6/UL (ref 3.8–5.2)
SODIUM SERPL-SCNC: 138 MMOL/L (ref 135–145)
TRIGL SERPL-MCNC: 62 MG/DL
WBC # BLD AUTO: 5.1 10E3/UL (ref 4–11)

## 2024-10-03 PROCEDURE — 80061 LIPID PANEL: CPT | Performed by: FAMILY MEDICINE

## 2024-10-03 PROCEDURE — 87624 HPV HI-RISK TYP POOLED RSLT: CPT | Performed by: FAMILY MEDICINE

## 2024-10-03 PROCEDURE — G0145 SCR C/V CYTO,THINLAYER,RESCR: HCPCS | Performed by: FAMILY MEDICINE

## 2024-10-03 PROCEDURE — 85027 COMPLETE CBC AUTOMATED: CPT | Performed by: FAMILY MEDICINE

## 2024-10-03 PROCEDURE — 99213 OFFICE O/P EST LOW 20 MIN: CPT | Mod: 25 | Performed by: FAMILY MEDICINE

## 2024-10-03 PROCEDURE — 99396 PREV VISIT EST AGE 40-64: CPT | Mod: 25 | Performed by: FAMILY MEDICINE

## 2024-10-03 PROCEDURE — 80053 COMPREHEN METABOLIC PANEL: CPT | Performed by: FAMILY MEDICINE

## 2024-10-03 PROCEDURE — 36415 COLL VENOUS BLD VENIPUNCTURE: CPT | Performed by: FAMILY MEDICINE

## 2024-10-03 PROCEDURE — 83036 HEMOGLOBIN GLYCOSYLATED A1C: CPT | Performed by: FAMILY MEDICINE

## 2024-10-03 RX ORDER — CHOLECALCIFEROL (VITAMIN D3) 50 MCG
1 TABLET ORAL DAILY
Status: SHIPPED
Start: 2024-10-03

## 2024-10-03 NOTE — PROGRESS NOTES
Preventive Care Visit  [facility]  Oct 3, 2024      Assessment & Plan     Routine general medical examination at a health care facility  Counseling  Appropriate preventive services were addressed with this patient via screening, questionnaire, or discussion as appropriate for fall prevention, nutrition, physical activity,  social engagement, weight loss and cognition.  Checklist reviewing preventive services available has been given to the patient.  Reviewed patient's diet, addressing concerns and/or questions.   - Comprehensive metabolic panel (BMP + Alb, Alk Phos, ALT, AST, Total. Bili, TP); Future  - CBC with platelets; Future    Cervical cancer screening  - HPV and Gynecologic Cytology Panel - Recommended Age 30 - 65 Years    Lipid screening  - Lipid panel; Future    Elevated glucose  - Hemoglobin A1c; Future    Impacted cerumen of left ear  Water lavage performed by MA with successful cerumen removal.  - MD REMOVAL IMPACTED CERUMEN IRRIGATION/LVG UNILAT    Screen for colon cancer  - Colonoscopy Screening  Referral; Future    Postmenopausal status  Intermittently taking calcium/vitamin D since calcium causes constipation.  Okay to continue with multivitamin and vitamin D alone.  - vitamin D3 (CHOLECALCIFEROL) 50 mcg (2000 units) tablet; Take 1 tablet (50 mcg) by mouth daily.    Repetitive strain injury of neck, initial encounter  Recurrent left neck strain contributing to left-sided headaches.  Follows with chiropractic and massage therapy.  Recommend daily stretching/strengthening exercises, handout provided.    Cramps of left lower extremity  Suspect bowing of the left lower extremity is causing some compensation and increased muscle tension.  Recommend nightly stretching, consider heating pad.  Consider physical therapy to correct for gait/alignment.      GERD without esophagitis  Occurs more commonly when eating out.  Discussed  "appropriate dietary changes.  Consider famotidine over Tums when symptoms are heightened.  Discussed weight loss.    Patient has been advised of split billing requirements and indicates understanding: Yes        BMI  Estimated body mass index is 29.28 kg/m  as calculated from the following:    Height as of this encounter: 1.568 m (5' 1.75\").    Weight as of this encounter: 72 kg (158 lb 12.8 oz).   Weight management plan: Discussed healthy diet and exercise guidelines      Kaleigh Daniels is a 53 year old, presenting for the following:  Physical (Pt is fasting today, wants to talk about headaches/imtrex. Pt has had cologuard twice- getting pressured to have the colonoscopy, thoughts? Left ear pt would like looked at- does bother pt sometimes. Delilah horses in the left lower leg only. Reflux issues. )        10/3/2024     8:42 AM   Additional Questions   Roomed by Rachel Garcia CMA        Health Care Directive  Patient does not have a Health Care Directive or Living Will: Discussed advance care planning with patient; information given to patient to review.    HPI    Migraines started during pregnancy. Left neck pain for years. Chiro, massage triggers it.     Left easy with some itching, flaking.    Left leg delilah horse at night, improved with stretching. Does geating pad.    Reflux, more when eating out. Tums help.         9/28/2024   General Health   How would you rate your overall physical health? Good   Feel stress (tense, anxious, or unable to sleep) To some extent      (!) STRESS CONCERN      9/28/2024   Nutrition   Three or more servings of calcium each day? Yes   Diet: Regular (no restrictions)   How many servings of fruit and vegetables per day? (!) 2-3   How many sweetened beverages each day? 0-1            9/28/2024   Exercise   Days per week of moderate/strenous exercise 7 days   Average minutes spent exercising at this level 40 min            9/28/2024   Social Factors   Frequency of gathering with " friends or relatives Patient declined   Worry food won't last until get money to buy more No   Food not last or not have enough money for food? No   Do you have housing? (Housing is defined as stable permanent housing and does not include staying ouside in a car, in a tent, in an abandoned building, in an overnight shelter, or couch-surfing.) Yes   Are you worried about losing your housing? No   Lack of transportation? No   Unable to get utilities (heat,electricity)? No            9/28/2024   Fall Risk   Fallen 2 or more times in the past year? No   Trouble with walking or balance? No             9/28/2024   Dental   Dentist two times every year? Yes            9/28/2024   TB Screening   Were you born outside of the [place]? No            Today's PHQ-2 Score:       10/2/2024    10:56 AM   PHQ-2 ( 1999 [employer])   Q1: Little interest or pleasure in doing things 0   Q2: Feeling down, depressed or hopeless 1   PHQ-2 Score 1   Q1: Little interest or pleasure in doing things Not at all   Q2: Feeling down, depressed or hopeless Several days   PHQ-2 Score 1           9/28/2024   Substance Use   Alcohol more than 3/day or more than 7/wk No   Do you use any other substances recreationally? No        Social History     Tobacco Use    Smoking status: Never    Smokeless tobacco: Never           9/24/2024   LAST FHS-7 RESULTS   1st degree relative breast or ovarian cancer No   Any relative bilateral breast cancer No   Any male have breast cancer No   Any ONE woman have BOTH breast AND ovarian cancer No   Any woman with breast cancer before 50yrs No   2 or more relatives with breast AND/OR ovarian cancer No   2 or more relatives with breast AND/OR bowel cancer No                   9/28/2024   One time HIV Screening   Previous HIV test? Yes          9/28/2024   STI Screening   New sexual partner(s) since last STI/HIV test? No        History of abnormal Pap smear: No - age 30- 64 PAP with HPV every 5 years recommended        Latest Ref  "Rng & Units 1/16/2020    10:31 AM 11/11/2015    10:21 AM   PAP / HPV   PAP Negative for squamous intraepithelial lesion or malignancy. Negative for squamous intraepithelial lesion or malignancy  Electronically signed by Mary Anne Wong CT (ASCP) on 1/23/2020 at 10:28 AM    Negative for squamous intraepithelial lesion or malignancy  Electronically signed by Yolanda Farris CT (ASCP) on 11/23/2015 at  1:03 PM      HPV 16 DNA NEG Negative     HPV 18 DNA NEG Negative     Other HR HPV NEG Negative       ASCVD Risk   The 10-year ASCVD risk score (Bunny SHUKLA, et al., 2019) is: 0.9%    Values used to calculate the score:      Age: 53 years      Sex: Female      Is Non- : No      Diabetic: No      Tobacco smoker: No      Systolic Blood Pressure: 106 mmHg      Is BP treated: No      HDL Cholesterol: 81 mg/dL      Total Cholesterol: 211 mg/dL           Reviewed and updated as needed this visit by Provider   Tobacco  Allergies  Meds  Problems  Med Hx  Surg Hx  Fam Hx               Objective    Exam  /74 (BP Location: Left arm, Patient Position: Sitting, Cuff Size: Adult Large)   Pulse 88   Resp 16   Ht 1.568 m (5' 1.75\")   Wt 72 kg (158 lb 12.8 oz)   SpO2 99%   BMI 29.28 kg/m     Estimated body mass index is 29.28 kg/m  as calculated from the following:    Height as of this encounter: 1.568 m (5' 1.75\").    Weight as of this encounter: 72 kg (158 lb 12.8 oz).    Physical Exam  GENERAL: alert and no distress  EYES: Eyes grossly normal to inspection, PERRL and conjunctivae and sclerae normal  HENT: normal cephalic/atraumatic, right ear: normal: no effusions, no erythema, normal landmarks, left ear: occluded with wax, nose and mouth without ulcers or lesions, oropharynx clear, and oral mucous membranes moist  NECK: no adenopathy, no asymmetry, masses, or scars  RESP: lungs clear to auscultation - no rales, rhonchi or wheezes  CV: regular rate and rhythm, normal S1 S2, no " S3 or S4, no murmur, click or rub, no peripheral edema  ABDOMEN: soft, nontender  MS: no gross musculoskeletal defects noted, no edema  SKIN: no suspicious lesions or rashes  NEURO: Normal strength and tone, mentation intact and speech normal  PSYCH: mentation appears normal, affect normal/bright  : Normal-appearing vagina, cervix with some stenosis but otherwise normal-appearing        Signed Electronically by: Rose Augustin DO

## 2024-10-03 NOTE — PATIENT INSTRUCTIONS
Patient Education   Preventive Care Advice   This is general advice given by our system to help you stay healthy. However, your care team may have specific advice just for you. Please talk to your care team about your preventive care needs.  Nutrition  Eat 5 or more servings of fruits and vegetables each day.  Try wheat bread, brown rice and whole grain pasta (instead of white bread, rice, and pasta).  Get enough calcium and vitamin D. Check the label on foods and aim for 100% of the RDA (recommended daily allowance).  Lifestyle  Exercise at least 150 minutes each week  (30 minutes a day, 5 days a week).  Do muscle strengthening activities 2 days a week. These help control your weight and prevent disease.  No smoking.  Wear sunscreen to prevent skin cancer.  Have a dental exam and cleaning every 6 months.  Yearly exams  See your health care team every year to talk about:  Any changes in your health.  Any medicines your care team has prescribed.  Preventive care, family planning, and ways to prevent chronic diseases.  Shots (vaccines)   HPV shots (up to age 26), if you've never had them before.  Hepatitis B shots (up to age 59), if you've never had them before.  COVID-19 shot: Get this shot when it's due.  Flu shot: Get a flu shot every year.  Tetanus shot: Get a tetanus shot every 10 years.  Pneumococcal, hepatitis A, and RSV shots: Ask your care team if you need these based on your risk.  Shingles shot (for age 50 and up)  General health tests  Diabetes screening:  Starting at age 35, Get screened for diabetes at least every 3 years.  If you are younger than age 35, ask your care team if you should be screened for diabetes.  Cholesterol test: At age 39, start having a cholesterol test every 5 years, or more often if advised.  Bone density scan (DEXA): At age 50, ask your care team if you should have this scan for osteoporosis (brittle bones).  Hepatitis C: Get tested at least once in your life.  STIs (sexually  transmitted infections)  Before age 24: Ask your care team if you should be screened for STIs.  After age 24: Get screened for STIs if you're at risk. You are at risk for STIs (including HIV) if:  You are sexually active with more than one person.  You don't use condoms every time.  You or a partner was diagnosed with a sexually transmitted infection.  If you are at risk for HIV, ask about PrEP medicine to prevent HIV.  Get tested for HIV at least once in your life, whether you are at risk for HIV or not.  Cancer screening tests  Cervical cancer screening: If you have a cervix, begin getting regular cervical cancer screening tests starting at age 21.  Breast cancer scan (mammogram): If you've ever had breasts, begin having regular mammograms starting at age 40. This is a scan to check for breast cancer.  Colon cancer screening: It is important to start screening for colon cancer at age 45.  Have a colonoscopy test every 10 years (or more often if you're at risk) Or, ask your provider about stool tests like a FIT test every year or Cologuard test every 3 years.  To learn more about your testing options, visit:   .  For help making a decision, visit:   https://bit.ly/fm81159.  Prostate cancer screening test: If you have a prostate, ask your care team if a prostate cancer screening test (PSA) at age 55 is right for you.  Lung cancer screening: If you are a current or former smoker ages 50 to 80, ask your care team if ongoing lung cancer screenings are right for you.  For informational purposes only. Not to replace the advice of your health care provider. Copyright   2023 [employer]. All rights reserved. Clinically reviewed by the [facility] Transitions Program. [employer] 033225 - REV 01/24.

## 2024-10-04 LAB
HPV HR 12 DNA CVX QL NAA+PROBE: NEGATIVE
HPV16 DNA CVX QL NAA+PROBE: NEGATIVE
HPV18 DNA CVX QL NAA+PROBE: NEGATIVE
HUMAN PAPILLOMA VIRUS FINAL DIAGNOSIS: NORMAL

## 2024-10-09 ENCOUNTER — TELEPHONE (OUTPATIENT)
Dept: PHYSICAL MEDICINE AND REHAB | Facility: CLINIC | Age: 54
End: 2024-10-09
Payer: COMMERCIAL

## 2024-10-09 LAB
BKR AP ASSOCIATED HPV REPORT: NORMAL
BKR LAB AP GYN ADEQUACY: NORMAL
BKR LAB AP GYN INTERPRETATION: NORMAL
BKR LAB AP PREVIOUS ABNORMAL: NORMAL
PATH REPORT.COMMENTS IMP SPEC: NORMAL
PATH REPORT.COMMENTS IMP SPEC: NORMAL
PATH REPORT.RELEVANT HX SPEC: NORMAL

## 2024-10-09 NOTE — TELEPHONE ENCOUNTER
PSP:  Tania Renteria CNP  Last clinic visit:  5/2023  Reason for call: Request for repeat injection  Clinical information:  Call received from pt. Pt inquiring about repeat bursa injection. Pt last had this completed 5/26/23.   Advice given to patient: Informed pt as it has been over 1 year since she has been seen she will need OV to be evaluated and discuss. Transferred pt to scheduling to make appt.   Provider to address: N/A

## 2024-10-21 ENCOUNTER — OFFICE VISIT (OUTPATIENT)
Dept: PHYSICAL MEDICINE AND REHAB | Facility: CLINIC | Age: 54
End: 2024-10-21
Payer: COMMERCIAL

## 2024-10-21 DIAGNOSIS — M79.18 MYOFASCIAL PAIN: ICD-10-CM

## 2024-10-21 DIAGNOSIS — M54.12 LEFT CERVICAL RADICULOPATHY: ICD-10-CM

## 2024-10-21 DIAGNOSIS — M25.551 LATERAL PAIN OF RIGHT HIP: ICD-10-CM

## 2024-10-21 DIAGNOSIS — M54.41 CHRONIC BILATERAL LOW BACK PAIN WITH RIGHT-SIDED SCIATICA: ICD-10-CM

## 2024-10-21 DIAGNOSIS — M70.61 GREATER TROCHANTERIC BURSITIS OF RIGHT HIP: Primary | ICD-10-CM

## 2024-10-21 DIAGNOSIS — G89.29 CHRONIC BILATERAL LOW BACK PAIN WITH RIGHT-SIDED SCIATICA: ICD-10-CM

## 2024-10-21 PROCEDURE — 99215 OFFICE O/P EST HI 40 MIN: CPT | Performed by: NURSE PRACTITIONER

## 2024-10-21 NOTE — LETTER
10/21/2024      Frances Mane  [address] [place] [address]      Dear Colleague,    Thank you for referring your patient, Frances Mane, to the [facility]. Please see a copy of my visit note below.      Assessment:     Diagnoses and all orders for this visit:  Greater trochanteric bursitis of right hip  -     Physical Therapy  Referral; Future  -     PAIN Bursa Injection Intermediate Joint Right; Future  -     Physical Therapy  Referral; Future  Lateral pain of right hip  -     Physical Therapy  Referral; Future  -     PAIN Bursa Injection Intermediate Joint Right; Future  -     Physical Therapy  Referral; Future  Chronic bilateral low back pain with right-sided sciatica  -     Physical Therapy  Referral; Future  -     Physical Therapy  Referral; Future  Left cervical radiculopathy  -     Physical Therapy  Referral; Future  -     Physical Therapy  Referral; Future  Myofascial pain  -     Physical Therapy  Referral; Future     Frances Mane is a 53 year old y.o. female with past medical history significant for  migraine headache, vestibular neuronitis, cervicalgia, trochanteric bursitis, thyromegaly who presents today for follow-up regarding:    -Right greater trochanteric/peritrochanteric bursitis.    -Chronic bilateral low back pain.  L5-S1 degenerative changes on x-ray.    -Chronic mild left cervical radiculopathy, C6 pattern with paresthesias into the thumb.     Plan:     A shared decision making plan was used. The patient's values and choices were respected. Prior medical records were reviewed today. The following represents what was discussed and decided upon by the provider and the patient.        -DIAGNOSTIC TESTS: Images were personally reviewed and interpreted.   -- Discussed that we could consider lumbar or cervical spine MRI, patient wants to hold off and continue working with  PT first.  --Xray lumbar spine dated 1/21/2020 is personally viewed images interpreted and shown to the patient.  There is interspace narrowing between L5-S1.  There is mild facet arthropathy and mild hypertrophic changes in the inferior portion of the SI joints left greater than right.  --X-ray of the right hip dated 1/21/2020 is personally viewed images interpreted and shown to the patient.  There is normal joint space and alignment with no fracture.  --Bone density scan dated 1/24/2020 report is reviewed and shows normal bone density  --Cervical spine MRI 2009, no images or report available however.    -INTERVENTIONS: Order placed for repeat right greater trochanteric/peritrochanteric bursa steroid injection under ultrasound guidance with Dr. Harris.  She did get over a year and 3 months relief with previous injection.     -MEDICATIONS: No changes in medications today.  Discussed side effects of medications and proper use. Patient verbalized understanding.    -PHYSICAL THERAPY: Referral to physical therapy for [employer] with [facility] as well as physical therapy for cervical MedX program with [facility]  Per patient preference for location.  Discussed the importance of core strengthening, ROM, stretching exercises with the patient and how each of these entities is important in decreasing pain.  Explained to the patient that the purpose of physical therapy is to teach the patient a home exercise program.  These exercises need to be performed every day in order to decrease pain and prevent future occurrences of pain.        -PATIENT EDUCATION:  Total time of 46 minutes, on the day of service, spent with the patient, reviewing the chart, placing orders, and documenting.     -FOLLOW UP: Follow-up bursa steroid injection with Dr. Harris  Advised to contact clinic if symptoms worsen or change.    Subjective:     Frances Mane is a 53 year old female who presents today for follow-up regarding recurrent right  lateral hip pain, patient reports that she received almost a year and 3 months relief with her previous injection, symptoms have been recurrent for the last couple of weeks and worse with walking and tenderness noted over the right lateral hip.  She does report that pain today is a 3/10, 10 at its worst, 0 at its best.  Rest and medications give her short-term relief.  She also reports chronic low back pain but this is more mild.  Patient also reports chronic neck pain with intermittent left arm pain with numbness and tingling to the left thumb.    Evaluation to Date:Xray lumbar spine and right hip dated 1/21/2020.  Bone density scan dated 1/24/2020.     Treatment to Date:   4 sessions of physical therapy.      Right greater trochanter bursa injection under ultrasound guidance on 2/20/2020.    Right trochanter bursa landmark injection on 3/22/2021.    Right greater trochanter bursa injection on 4/29/2021.  RIGHT greater trochanteric bursa steroid injection under ultrasound guidance 5/2/2022.  RIGHT greater trochanteric bursa steroid injection 5/26/2023, relief for 1 year and 3 months.    Patient Active Problem List   Diagnosis     Migraine Headache     Cervicalgia     Trochanteric Bursitis     Thyromegaly     Atrophic vaginitis     Genital herpes simplex type 1 infection     Greater trochanteric bursitis of right hip     Chronic bilateral low back pain without sciatica     Left cervical radiculopathy       Current Outpatient Medications   Medication Sig Dispense Refill     cyclobenzaprine (FLEXERIL) 10 MG tablet TAKE 1 TABLET (10 MG) BY MOUTH 3 TIMES DAILY AS NEEDED 60 tablet 3     naproxen sodium (ALEVE) 220 MG tablet [NAPROXEN SODIUM (ALEVE) 220 MG TABLET] Take 220 mg by mouth as needed for pain.       calcium polycarbophil (FIBERCON) 625 mg tablet [CALCIUM POLYCARBOPHIL (FIBERCON) 625 MG TABLET] Take 625 mg by mouth daily.       FLAXSEED OIL ORAL [FLAXSEED OIL ORAL] Take by mouth.       hydrocortisone 2.5 % cream  [HYDROCORTISONE 2.5 % CREAM] Apply to inflamed skin 2-3 times daily until clear but not more than 2-3 weeks at a time 30 g 3     multivitamin therapeutic (THERAGRAN) tablet [MULTIVITAMIN THERAPEUTIC (THERAGRAN) TABLET] Take 1 tablet by mouth daily.       neomycin-polymixin-dexamethasone (MAXITROL) ophthalmic ointment Apply to eyelids daily as needed for eczema 30 g 0     SUMAtriptan (IMITREX) 50 MG tablet TAKE 1 - 2 TABLETS BY MOUTH EVERY 2 HOURS AS NEEDED. MAX OF 4 TABLETS PER 24 HOURS 12 tablet 3     triamcinolone (KENALOG) 0.5 % cream [TRIAMCINOLONE (KENALOG) 0.5 % CREAM] Apply to skin lesions 3 times a day for 2 weeks, not on face 60 g 1     vitamin D3 (CHOLECALCIFEROL) 50 mcg (2000 units) tablet Take 1 tablet (50 mcg) by mouth daily.       No current facility-administered medications for this visit.       Allergies   Allergen Reactions     Augmentin [Amoxicillin-Pot Clavulanate]      Vomiting     Erythromycin Unknown and GI Disturbance     rash, vomit       Latex Unknown and Other (See Comments)     Local reaction     Morphine Unknown and GI Disturbance     Cefadroxil Unknown and Rash       Past Medical History:   Diagnosis Date     Ehrlichiosis chafeensis (E. chafeensis)     Created by Conversion         Review of Systems  ROS:  Specifically negative for bowel/bladder dysfunction, balance changes, headache, dizziness, foot drop, fevers, chills, appetite changes, nausea/vomiting, unexplained weight loss. Otherwise 13 systems reviewed are negative. Please see the patient's intake questionnaire from today for details.    Reviewed Social, Family, Past Medical and Past Surgical history with patient, no significant changes noted since prior visit.     Objective:     PHYSICAL EXAMINATION:    --CONSTITUTIONAL: Well developed, well nourished, healthy appearing individual.  --PSYCHIATRIC: Appropriate mood and affect. No difficulty interacting due to temper, social withdrawal, or memory issues.  --SKIN: Lumbar region is  dry and intact.   --RESPIRATORY: Normal rhythm and effort. No abnormal accessory muscle breathing patterns noted.   --MUSCULOSKELETAL:  Normal lumbar lordosis noted, no lateral shift.  --GROSS MOTOR: Easily arises from a seated position. Gait is non-antalgic  --LUMBAR SPINE:  Inspection reveals no evidence of deformity. Range of motion is not limited in lumbar flexion, extension, lateral rotation. No tenderness to palpation lumbar spine.  Tenderness to palpation over right lateral hip/greater trochanteric bursae region.  Straight leg raising is negative to radicular pain. Sciatic notch non-tender.   --HIPS: Full range of motion bilaterally.   --NEUROLOGIC: Bilateral patellar and achilles reflexes are physiologic and symmetric. Sensation to light touch is intact in the bilateral L4, L5, and S1 dermatomes.    CERVICAL:   --UPPER EXTREMITY MOTOR TESTING:  Wrist flexion left 5/5, right 5/5  Wrist extension left 5/5, right 5/5  Pronators left 5/5, right 5/5  Biceps left 5/5, right 5/5   Triceps left 5/5, right 5/5   Shoulder abduction left 5/5, right 5/5   left 5/5, right 5/5  --NEUROLOGIC:  CN III-XII are grossly intact.  Bilateral patellar and achilles reflexes are physiologic and symmetric. 2/4 symmetric biceps, brachioradialis, triceps reflexes bilaterally.  Sensation to upper extremities is intact.  Tautness over the left trapezius region.  Negative Clifford's bilaterally.    --VASCULAR:  2/4 radial pulses bilaterally.  Warm upper limbs bilaterally.  Capillary refill in the upper extremities is less than 1 second. No obvious lower extremity swelling or varicosities.   --CERVICAL SPINE: Inspection reveals no evidence of deformity. Range of motion is not limited in cervical flexion, extension, or lateral rotation. No tenderness to palpation. Spurlings maneuver is negative to radicular pain.    --SHOULDERS: Full range of motion bilaterally. Negative empty can.    RESULTS:   Imaging: Spine imaging was reviewed today.  The images were shown to the patient and the findings were explained using a spine model.      Lumbar spine and hip x-ray reviewed                Again, thank you for allowing me to participate in the care of your patient.        Sincerely,        Tania Renteria, CNP

## 2024-10-21 NOTE — PROGRESS NOTES
Assessment:     Diagnoses and all orders for this visit:  Greater trochanteric bursitis of right hip  -     Physical Therapy  Referral; Future  -     PAIN Bursa Injection Intermediate Joint Right; Future  -     Physical Therapy  Referral; Future  Lateral pain of right hip  -     Physical Therapy  Referral; Future  -     PAIN Bursa Injection Intermediate Joint Right; Future  -     Physical Therapy  Referral; Future  Chronic bilateral low back pain with right-sided sciatica  -     Physical Therapy  Referral; Future  -     Physical Therapy  Referral; Future  Left cervical radiculopathy  -     Physical Therapy  Referral; Future  -     Physical Therapy  Referral; Future  Myofascial pain  -     Physical Therapy  Referral; Future     Frances Mane is a 53 year old y.o. female with past medical history significant for  migraine headache, vestibular neuronitis, cervicalgia, trochanteric bursitis, thyromegaly who presents today for follow-up regarding:    -Right greater trochanteric/peritrochanteric bursitis.    -Chronic bilateral low back pain.  L5-S1 degenerative changes on x-ray.    -Chronic mild left cervical radiculopathy, C6 pattern with paresthesias into the thumb.     Plan:     A shared decision making plan was used. The patient's values and choices were respected. Prior medical records were reviewed today. The following represents what was discussed and decided upon by the provider and the patient.        -DIAGNOSTIC TESTS: Images were personally reviewed and interpreted.   -- Discussed that we could consider lumbar or cervical spine MRI, patient wants to hold off and continue working with PT first.  --Xray lumbar spine dated 1/21/2020 is personally viewed images interpreted and shown to the patient.  There is interspace narrowing between L5-S1.  There is mild facet arthropathy and mild hypertrophic changes in the inferior portion of  the SI joints left greater than right.  --X-ray of the right hip dated 1/21/2020 is personally viewed images interpreted and shown to the patient.  There is normal joint space and alignment with no fracture.  --Bone density scan dated 1/24/2020 report is reviewed and shows normal bone density  --Cervical spine MRI 2009, no images or report available however.    -INTERVENTIONS: Order placed for repeat right greater trochanteric/peritrochanteric bursa steroid injection under ultrasound guidance with Dr. Harris.  She did get over a year and 3 months relief with previous injection.     -MEDICATIONS: No changes in medications today.  Discussed side effects of medications and proper use. Patient verbalized understanding.    -PHYSICAL THERAPY: Referral to physical therapy for [employer] with [facility] as well as physical therapy for cervical MedX program with [facility]  Per patient preference for location.  Discussed the importance of core strengthening, ROM, stretching exercises with the patient and how each of these entities is important in decreasing pain.  Explained to the patient that the purpose of physical therapy is to teach the patient a home exercise program.  These exercises need to be performed every day in order to decrease pain and prevent future occurrences of pain.        -PATIENT EDUCATION:  Total time of 46 minutes, on the day of service, spent with the patient, reviewing the chart, placing orders, and documenting.     -FOLLOW UP: Follow-up bursa steroid injection with Dr. Harris  Advised to contact clinic if symptoms worsen or change.    Subjective:     Frances Mane is a 53 year old female who presents today for follow-up regarding recurrent right lateral hip pain, patient reports that she received almost a year and 3 months relief with her previous injection, symptoms have been recurrent for the last couple of weeks and worse with walking and tenderness noted over the right lateral hip.   She does report that pain today is a 3/10, 10 at its worst, 0 at its best.  Rest and medications give her short-term relief.  She also reports chronic low back pain but this is more mild.  Patient also reports chronic neck pain with intermittent left arm pain with numbness and tingling to the left thumb.    Evaluation to Date:Xray lumbar spine and right hip dated 1/21/2020.  Bone density scan dated 1/24/2020.     Treatment to Date:   4 sessions of physical therapy.      Right greater trochanter bursa injection under ultrasound guidance on 2/20/2020.    Right trochanter bursa landmark injection on 3/22/2021.    Right greater trochanter bursa injection on 4/29/2021.  RIGHT greater trochanteric bursa steroid injection under ultrasound guidance 5/2/2022.  RIGHT greater trochanteric bursa steroid injection 5/26/2023, relief for 1 year and 3 months.    Patient Active Problem List   Diagnosis    Migraine Headache    Cervicalgia    Trochanteric Bursitis    Thyromegaly    Atrophic vaginitis    Genital herpes simplex type 1 infection    Greater trochanteric bursitis of right hip    Chronic bilateral low back pain without sciatica    Left cervical radiculopathy       Current Outpatient Medications   Medication Sig Dispense Refill    cyclobenzaprine (FLEXERIL) 10 MG tablet TAKE 1 TABLET (10 MG) BY MOUTH 3 TIMES DAILY AS NEEDED 60 tablet 3    naproxen sodium (ALEVE) 220 MG tablet [NAPROXEN SODIUM (ALEVE) 220 MG TABLET] Take 220 mg by mouth as needed for pain.      calcium polycarbophil (FIBERCON) 625 mg tablet [CALCIUM POLYCARBOPHIL (FIBERCON) 625 MG TABLET] Take 625 mg by mouth daily.      FLAXSEED OIL ORAL [FLAXSEED OIL ORAL] Take by mouth.      hydrocortisone 2.5 % cream [HYDROCORTISONE 2.5 % CREAM] Apply to inflamed skin 2-3 times daily until clear but not more than 2-3 weeks at a time 30 g 3    multivitamin therapeutic (THERAGRAN) tablet [MULTIVITAMIN THERAPEUTIC (THERAGRAN) TABLET] Take 1 tablet by mouth daily.       neomycin-polymixin-dexamethasone (MAXITROL) ophthalmic ointment Apply to eyelids daily as needed for eczema 30 g 0    SUMAtriptan (IMITREX) 50 MG tablet TAKE 1 - 2 TABLETS BY MOUTH EVERY 2 HOURS AS NEEDED. MAX OF 4 TABLETS PER 24 HOURS 12 tablet 3    triamcinolone (KENALOG) 0.5 % cream [TRIAMCINOLONE (KENALOG) 0.5 % CREAM] Apply to skin lesions 3 times a day for 2 weeks, not on face 60 g 1    vitamin D3 (CHOLECALCIFEROL) 50 mcg (2000 units) tablet Take 1 tablet (50 mcg) by mouth daily.       No current facility-administered medications for this visit.       Allergies   Allergen Reactions    Augmentin [Amoxicillin-Pot Clavulanate]      Vomiting    Erythromycin Unknown and GI Disturbance     rash, vomit      Latex Unknown and Other (See Comments)     Local reaction    Morphine Unknown and GI Disturbance    Cefadroxil Unknown and Rash       Past Medical History:   Diagnosis Date    Ehrlichiosis chafeensis (E. chafeensis)     Created by Conversion         Review of Systems  ROS:  Specifically negative for bowel/bladder dysfunction, balance changes, headache, dizziness, foot drop, fevers, chills, appetite changes, nausea/vomiting, unexplained weight loss. Otherwise 13 systems reviewed are negative. Please see the patient's intake questionnaire from today for details.    Reviewed Social, Family, Past Medical and Past Surgical history with patient, no significant changes noted since prior visit.     Objective:     PHYSICAL EXAMINATION:    --CONSTITUTIONAL: Well developed, well nourished, healthy appearing individual.  --PSYCHIATRIC: Appropriate mood and affect. No difficulty interacting due to temper, social withdrawal, or memory issues.  --SKIN: Lumbar region is dry and intact.   --RESPIRATORY: Normal rhythm and effort. No abnormal accessory muscle breathing patterns noted.   --MUSCULOSKELETAL:  Normal lumbar lordosis noted, no lateral shift.  --GROSS MOTOR: Easily arises from a seated position. Gait is  non-antalgic  --LUMBAR SPINE:  Inspection reveals no evidence of deformity. Range of motion is not limited in lumbar flexion, extension, lateral rotation. No tenderness to palpation lumbar spine.  Tenderness to palpation over right lateral hip/greater trochanteric bursae region.  Straight leg raising is negative to radicular pain. Sciatic notch non-tender.   --HIPS: Full range of motion bilaterally.   --NEUROLOGIC: Bilateral patellar and achilles reflexes are physiologic and symmetric. Sensation to light touch is intact in the bilateral L4, L5, and S1 dermatomes.    CERVICAL:   --UPPER EXTREMITY MOTOR TESTING:  Wrist flexion left 5/5, right 5/5  Wrist extension left 5/5, right 5/5  Pronators left 5/5, right 5/5  Biceps left 5/5, right 5/5   Triceps left 5/5, right 5/5   Shoulder abduction left 5/5, right 5/5   left 5/5, right 5/5  --NEUROLOGIC:  CN III-XII are grossly intact.  Bilateral patellar and achilles reflexes are physiologic and symmetric. 2/4 symmetric biceps, brachioradialis, triceps reflexes bilaterally.  Sensation to upper extremities is intact.  Tautness over the left trapezius region.  Negative Clifford's bilaterally.    --VASCULAR:  2/4 radial pulses bilaterally.  Warm upper limbs bilaterally.  Capillary refill in the upper extremities is less than 1 second. No obvious lower extremity swelling or varicosities.   --CERVICAL SPINE: Inspection reveals no evidence of deformity. Range of motion is not limited in cervical flexion, extension, or lateral rotation. No tenderness to palpation. Spurlings maneuver is negative to radicular pain.    --SHOULDERS: Full range of motion bilaterally. Negative empty can.    RESULTS:   Imaging: Spine imaging was reviewed today. The images were shown to the patient and the findings were explained using a spine model.      Lumbar spine and hip x-ray reviewed

## 2024-10-21 NOTE — PATIENT INSTRUCTIONS
~Spine Center Scheduling #[phone].  ~Please call our [facility] Spine Nurse Navigation #[phone] with any questions or concerns about your treatment plan, if symptoms worsen and you would like to be seen urgently, or if you have problems controlling bladder and bowel function.  ~For any future flareups or new symptoms, recommend follow-up in clinic or contact the nurse navigator line.  ~Please note that any My Chart messages may take multiple days for a response due to the high volume of patients seen in clinic.  Anything sent Thursday night or after will be answered the following week when able, as Tania Renteria CNP does not work in clinic on Fridays.   ~Tania Renteria CNP is at the [facility] on Tuesdays, otherwise primarily at the [facility].       ~You have been referred for Physical Therapy to [facility]. They will call you to schedule an appointment.      Discussed the importance of core strengthening, ROM, stretching exercises and how each of these entities is important in decreasing pain and improving long term spine health.  The purpose of physical therapy is to teach you an individualized home exercise program.  These exercises need to be performed every day in order to decrease pain and prevent future occurrences of pain.        An external referral for PT has been placed and will be faxed to the referral facility. Please contact the Spine Center if you do not hear anything from the referral facility in the next week or less.

## 2024-10-22 ENCOUNTER — TELEPHONE (OUTPATIENT)
Dept: GASTROENTEROLOGY | Facility: CLINIC | Age: 54
End: 2024-10-22
Payer: COMMERCIAL

## 2024-10-22 NOTE — TELEPHONE ENCOUNTER
"Endoscopy Scheduling Screen    Have you had any respiratory illness or flu-like symptoms in the last 10 days?  No    What is your communication preference for Instructions and/or Bowel Prep?   MyChart    What insurance is in the chart?  Other:  BCBS    Ordering/Referring Provider: Rose Augustin, DO in [facility]   (If ordering provider performs procedure, schedule with ordering provider unless otherwise instructed. )    BMI: Estimated body mass index is 29.28 kg/m  as calculated from the following:    Height as of 10/3/24: 1.568 m (5' 1.75\").    Weight as of 10/3/24: 72 kg (158 lb 12.8 oz).     Sedation Ordered  moderate sedation.   If patient BMI > 50 do not schedule in [facility].    If patient BMI > 45 do not schedule at [facility].    Are you taking methadone or Suboxone?  NO, No RN review required.    Have you been diagnosed and are being treated for severe PTSD or severe anxiety?  NO, No RN review required.    Are you taking any prescription medications for pain 3 or more times per week?   NO, No RN review required.  Flexeril and Imitrex as needed    Do you have a history of malignant hyperthermia?  No    (Females) Are you currently pregnant?   No     Have you been diagnosed or told you have pulmonary hypertension?   No    Do you have an LVAD?  No    Have you been told you have moderate to severe sleep apnea?  No.    Have you been told you have COPD, asthma, or any other lung disease?  No    Do you have any heart conditions?  No     Have you ever had or are you waiting for an organ transplant?  No. Continue scheduling, no site restrictions.    Have you had a stroke or transient ischemic attack (TIA aka \"mini stroke\" in the last 6 months?   No    Have you been diagnosed with or been told you have cirrhosis of the liver?   No.    Are you currently on dialysis?   No    Do you need assistance transferring?   No    BMI: Estimated body mass index is 29.28 kg/m  as calculated from the following:    Height as of " "10/3/24: 1.568 m (5' 1.75\").    Weight as of 10/3/24: 72 kg (158 lb 12.8 oz).     Is patients BMI > 40 and scheduling location [facility]?  No    Do you take an injectable or oral medication for weight loss or diabetes (excluding insulin)?  No    Do you take the medication Naltrexone?  No    Do you take blood thinners?  No       Prep   Are you currently on dialysis or do you have chronic kidney disease?  No    Do you have a diagnosis of diabetes?  No    Do you have a diagnosis of cystic fibrosis (CF)?  No    On a regular basis do you go 3 -5 days between bowel movements?  No    BMI > 40?  No    Preferred Pharmacy:    [id number] [place] - [address]  [address]  [place] [address]  Phone: [phone] Fax: [phone]      Final Scheduling Details     Procedure scheduled  Colonoscopy    Pt prefers [place] and declined other locations.  She is going to talk to her provider about sending referral elsewhere and was also encouraged to CB in a month to see if we have the new schedule yet.    Patient Reminders:   You will receive a call from a Nurse to review instructions and health history.  This assessment must be completed prior to your procedure.  Failure to complete the Nurse assessment may result in the procedure being cancelled.      On the day of your procedure, please designate an adult(s) who can drive you home stay with you for the next 24 hours. The medicines used in the exam will make you sleepy. You will not be able to drive.      You cannot take public transportation, ride share services, or non-medical taxi service without a responsible caregiver.  Medical transport services are allowed with the requirement that a responsible caregiver will receive you at your destination.  We require that drivers and caregivers are confirmed prior to your procedure.    "

## 2024-10-25 ENCOUNTER — THERAPY VISIT (OUTPATIENT)
Dept: PHYSICAL THERAPY | Facility: CLINIC | Age: 54
End: 2024-10-25
Attending: NURSE PRACTITIONER
Payer: COMMERCIAL

## 2024-10-25 DIAGNOSIS — M54.12 LEFT CERVICAL RADICULOPATHY: ICD-10-CM

## 2024-10-25 DIAGNOSIS — M79.18 MYOFASCIAL PAIN: ICD-10-CM

## 2024-10-25 PROBLEM — M54.41 CHRONIC BILATERAL LOW BACK PAIN WITH RIGHT-SIDED SCIATICA: Status: ACTIVE | Noted: 2023-05-11

## 2024-10-25 PROBLEM — M25.551 LATERAL PAIN OF RIGHT HIP: Status: ACTIVE | Noted: 2024-10-25

## 2024-10-25 PROCEDURE — 97110 THERAPEUTIC EXERCISES: CPT | Mod: GP | Performed by: PHYSICAL THERAPIST

## 2024-10-25 PROCEDURE — 97161 PT EVAL LOW COMPLEX 20 MIN: CPT | Mod: GP | Performed by: PHYSICAL THERAPIST

## 2024-10-25 PROCEDURE — 97140 MANUAL THERAPY 1/> REGIONS: CPT | Mod: GP | Performed by: PHYSICAL THERAPIST

## 2024-10-25 NOTE — PROGRESS NOTES
PHYSICAL THERAPY EVALUATION  Type of Visit: Evaluation        Fall Risk Screen:  Fall screen completed by: PT  Have you fallen 2 or more times in the past year?: (Patient-Rptd) No  Have you fallen and had an injury in the past year?: (Patient-Rptd) No  Is patient a fall risk?: No    Subjective       Presenting condition or subjective complaint: (Patient-Rptd) Left neck and and arm pain.  Pt presents w/ chronic pain that starts near L temple which radiates down L neck and down L arm to pinky finger.   Intermittent pain up to 10/10 but typically 5/10.  Pt notes intermittent numbness L pinky finger.  No UE weakness.  Temporal HA 3X/week.  Meds: flexeril at night prn,  Imitrex prn.   Pt is L dominant.  Pt sees chiro < 1x/month,  massage therapy 1X/month.  Pt notes intermittent jaw tension, can wake w/ jaw stiffness  Worse: Stress, turning head to L w/ driving.  Better: KT tape, heat, alleve,ibuprofen, tylenol  Date of onset:      Relevant medical history:   migraines started 22 years ago  Dates & types of surgery: (Patient-Rptd) C/S x 2 2001 2002    Prior diagnostic imaging/testing results: (Patient-Rptd) MRI; X-ray; Bone scan   MRI 2015:  IMPRESSION:  CONCLUSION:  1.  Minimal degenerative change at a couple of levels without central canal or foraminal stenosis.  2.  No cord signal abnormality.  3.  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  Prior therapy history for the same diagnosis, illness or injury: (Patient-Rptd) Yes (Patient-Rptd) Med X 2022 and prior years    Prior Level of Function  Transfers: Independent  Ambulation: Independent  ADL: Independent  IADL: Housekeeping, Work    Living Environment  Social support: (Patient-Rptd) With a significant other or spouse   Type of home: (Patient-Rptd) House; Multi-level; Basement   Stairs to enter the home: (Patient-Rptd) Yes (Patient-Rptd) 5 Is there a railing: (Patient-Rptd) Yes     Ramp: (Patient-Rptd) No   Stairs inside the home:  (Patient-Rptd) Yes (Patient-Rptd) 15 Is there a railing: (Patient-Rptd) Yes     Help at home: (Patient-Rptd) None  Equipment owned:       Employment: (Patient-Rptd) Yes  --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  Hobbies/Interests: (Patient-Rptd) Walking kayak exercises.  20 min work out video.  Walks 1-2 miles /day    Patient goals for therapy: (Patient-Rptd) Live without neck pain         Objective   CERVICAL SPINE EVALUATION  POSTURE: L shoulder slightly lower, mild dowagers.  Stands w/ head slightly tipped back.  ROM: CROM WFL notes pull on L, ext 75%, Rot R 70%, L 80%, SB R 75%, L 80%.  B shoulder AROM WNL's.    STRENGTH: L shoulder flex, ABD 4/5,  L triceps 4+/5 rest of B UE thru wrist 5/5.   FLEXIBILITY: mild pec tightness  SPECIAL TESTS: ALAR neg.  Cervical quadrant R : noted tightness on L side,  L neg. Distraction no change.    PALPATION: L > R tightness UT/ levator/ rhomboids.    SPINAL SEGMENTAL CONCLUSIONS: decreased sideglide to L C3-5    Assessment & Plan   CLINICAL IMPRESSIONS  Medical Diagnosis: Left cervical radiculopathy    Treatment Diagnosis: L cervical radiculopathy   Impression/Assessment: Patient is a 53 year old female with neck and L UE complaints.  The following significant findings have been identified: Pain, Decreased ROM/flexibility, Decreased joint mobility, Decreased strength, Impaired muscle performance, Decreased activity tolerance, and Impaired posture. These impairments interfere with their ability to perform self care tasks, work tasks, and driving  as compared to previous level of function.     Clinical Decision Making (Complexity):  Clinical Presentation: Stable/Uncomplicated  Clinical Presentation Rationale: based on medical and personal factors listed in PT evaluation  Clinical Decision Making (Complexity): Low complexity    PLAN OF CARE  Treatment Interventions:  Interventions: Manual Therapy,  Neuromuscular Re-education, Therapeutic Activity, Therapeutic Exercise    Long Term Goals     PT Goal 1  Goal Identifier: 1.  Goal Description: Pt will report increased ease turning head to drive (90% of full motion)  Target Date: 11/29/24  PT Goal 2  Goal Identifier: 2.  Goal Description: Pt will report decrease HA to 1X/week  Target Date: 01/03/25  PT Goal 3  Goal Identifier: 3.  Goal Description: Pt will be independent and consistent w/HEP to manage symptoms  Target Date: 01/03/25      Frequency of Treatment: 1X/week  Duration of Treatment: 8 weeks (due to PT schedule unable to start until mid Nov therefore will put goals out 10 weeks)    Recommended Referrals to Other Professionals:  none  Education Assessment:   Learner/Method: Patient;Listening;Reading;Demonstration;No Barriers to Learning;Pictures/Video    Risks and benefits of evaluation/treatment have been explained.   Patient/Family/caregiver agrees with Plan of Care.     Evaluation Time:     PT Eval, Low Complexity Minutes (48079): 25       Signing Clinician: Soco Coulter PT

## 2024-11-08 ENCOUNTER — RADIOLOGY INJECTION OFFICE VISIT (OUTPATIENT)
Dept: PHYSICAL MEDICINE AND REHAB | Facility: CLINIC | Age: 54
End: 2024-11-08
Attending: NURSE PRACTITIONER
Payer: COMMERCIAL

## 2024-11-08 VITALS
HEART RATE: 78 BPM | SYSTOLIC BLOOD PRESSURE: 120 MMHG | OXYGEN SATURATION: 99 % | DIASTOLIC BLOOD PRESSURE: 63 MMHG | RESPIRATION RATE: 16 BRPM | TEMPERATURE: 97.9 F

## 2024-11-08 DIAGNOSIS — M70.61 GREATER TROCHANTERIC BURSITIS OF RIGHT HIP: ICD-10-CM

## 2024-11-08 DIAGNOSIS — M25.551 LATERAL PAIN OF RIGHT HIP: ICD-10-CM

## 2024-11-08 PROCEDURE — 20611 DRAIN/INJ JOINT/BURSA W/US: CPT | Mod: RT | Performed by: PAIN MEDICINE

## 2024-11-08 RX ORDER — LIDOCAINE HYDROCHLORIDE 10 MG/ML
INJECTION, SOLUTION EPIDURAL; INFILTRATION; INTRACAUDAL; PERINEURAL
Status: COMPLETED | OUTPATIENT
Start: 2024-11-08 | End: 2024-11-08

## 2024-11-08 RX ORDER — TRIAMCINOLONE ACETONIDE 40 MG/ML
INJECTION, SUSPENSION INTRA-ARTICULAR; INTRAMUSCULAR
Status: COMPLETED | OUTPATIENT
Start: 2024-11-08 | End: 2024-11-08

## 2024-11-08 RX ADMIN — LIDOCAINE HYDROCHLORIDE 1 ML: 10 INJECTION, SOLUTION EPIDURAL; INFILTRATION; INTRACAUDAL; PERINEURAL at 08:46

## 2024-11-08 RX ADMIN — TRIAMCINOLONE ACETONIDE 20 MG: 40 INJECTION, SUSPENSION INTRA-ARTICULAR; INTRAMUSCULAR at 08:46

## 2024-11-08 ASSESSMENT — PAIN SCALES - GENERAL
PAINLEVEL_OUTOF10: MILD PAIN (3)
PAINLEVEL_OUTOF10: NO PAIN (1)

## 2024-11-08 NOTE — PATIENT INSTRUCTIONS
DISCHARGE INSTRUCTIONS    During office hours (8:00 a.m.- 4:00 p.m.) questions or concerns may be answered  by calling [facility] Navigation Nurses at  [phone].  Messages received after hours will be returned the following business day.      In the case of an emergency, please dial 911 or seek assistance at the nearest Emergency Room/Urgent Care facility.     All Patients:    You may experience an increase in your symptoms for the first 2 days (It may take anywhere between 2 days- 2 weeks for the steroid to have maximum effect).    You may use ice on the injection site, as frequently as 20 minutes each hour if needed.    You may take your pain medicine.    You may continue taking your regular medication after your injection. If you have had a Medial Branch Block you may resume pain medication once your pain diary is completed.    You may shower. No swimming, tub bath or hot tub for 48 hours.  You may remove your bandaid/bandage as soon as you are home.    You may resume light activities, as tolerated.    Resume your usual diet as tolerated.    If you were told to hold any blood thinning medications you may resume taking them 24 hours after your procedure as prescribed.    It is strongly advised that you do not drive for 1-3 hours post injection.    If you have had oral sedation:  Do not drive for 8 hours post injection.      If you have had IV sedation:  Do not drive for 24 hours post injection.  Do not operate hazardous machinery or make important personal/business decisions for 24 hours.      POSSIBLE STEROID SIDE EFFECTS (If steroid/cortisone was used for your procedure)    -If you experience these symptoms, it should only last for a short period    Swelling of the legs              Skin redness (flushing)     Mouth (oral) irritation   Blood sugar (glucose) levels            Sweats                    Mood changes  Headache  Sleeplessness  Weakened immune system for up to 14 days, which could increase  the risk of erik the COVID-19 virus and/or experiencing more severe symptoms of the disease, if exposed.  Decreased effectiveness of the flu vaccine if given within 2 weeks of the steroid.         POSSIBLE PROCEDURE SIDE EFFECTS  -Call the Spine Center if you are concerned  Increased Pain           Increased numbness/tingling      Nausea/Vomiting          Bruising/bleeding at site      Redness or swelling                                              Difficulty walking      Weakness           Fever greater than 100.5    *In the event of a severe headache after an epidural steroid injection that is relieved by lying down, please call the [facility] to speak with a clinical staff member*

## 2025-01-01 ENCOUNTER — MYC MEDICAL ADVICE (OUTPATIENT)
Dept: FAMILY MEDICINE | Facility: CLINIC | Age: 55
End: 2025-01-01
Payer: COMMERCIAL

## 2025-01-01 DIAGNOSIS — R06.83 SNORING: Primary | ICD-10-CM

## 2025-01-02 ENCOUNTER — TRANSFERRED RECORDS (OUTPATIENT)
Dept: HEALTH INFORMATION MANAGEMENT | Facility: CLINIC | Age: 55
End: 2025-01-02
Payer: COMMERCIAL

## 2025-02-16 DIAGNOSIS — Z79.899 MEDICATION MANAGEMENT: ICD-10-CM

## 2025-02-17 RX ORDER — SUMATRIPTAN 50 MG/1
TABLET, FILM COATED ORAL
Qty: 12 TABLET | Refills: 3 | Status: SHIPPED | OUTPATIENT
Start: 2025-02-17

## 2025-04-14 ENCOUNTER — TELEPHONE (OUTPATIENT)
Dept: SLEEP MEDICINE | Facility: CLINIC | Age: 55
End: 2025-04-14
Payer: COMMERCIAL

## 2025-04-14 NOTE — TELEPHONE ENCOUNTER
General Call      Reason for Call:  results     What are your questions or concerns:  requesting results from her overnight oximetry test. Please call     Date of last appointment with provider: 2/28/25    Could we send this information to you in [facility] or would you prefer to receive a phone call?:   Patient would prefer a phone call   Okay to leave a detailed message?: Yes at Cell number on file:    Telephone Information:   Mobile [phone]

## 2025-04-22 ENCOUNTER — TELEPHONE (OUTPATIENT)
Dept: SLEEP MEDICINE | Facility: CLINIC | Age: 55
End: 2025-04-22
Payer: COMMERCIAL

## 2025-04-22 NOTE — TELEPHONE ENCOUNTER
Writer left a detail message for patient that we needed to schedule an HOMA but after further inspection, HOMA was already completed.    Please look at previous Telephone Encounter.  Provider recommends doing in lab sleep study. Please assist patient with scheduling a home sleep test upon call back.

## 2025-08-25 ASSESSMENT — SLEEP AND FATIGUE QUESTIONNAIRES
HOW LIKELY ARE YOU TO NOD OFF OR FALL ASLEEP WHILE WATCHING TV: SLIGHT CHANCE OF DOZING
HOW LIKELY ARE YOU TO NOD OFF OR FALL ASLEEP WHILE SITTING QUIETLY AFTER LUNCH WITHOUT ALCOHOL: WOULD NEVER DOZE
HOW LIKELY ARE YOU TO NOD OFF OR FALL ASLEEP WHILE SITTING AND READING: MODERATE CHANCE OF DOZING
HOW LIKELY ARE YOU TO NOD OFF OR FALL ASLEEP WHILE LYING DOWN TO REST IN THE AFTERNOON WHEN CIRCUMSTANCES PERMIT: WOULD NEVER DOZE
HOW LIKELY ARE YOU TO NOD OFF OR FALL ASLEEP WHILE SITTING AND TALKING TO SOMEONE: WOULD NEVER DOZE
HOW LIKELY ARE YOU TO NOD OFF OR FALL ASLEEP WHILE SITTING INACTIVE IN A PUBLIC PLACE: WOULD NEVER DOZE
HOW LIKELY ARE YOU TO NOD OFF OR FALL ASLEEP IN A CAR, WHILE STOPPED FOR A FEW MINUTES IN TRAFFIC: WOULD NEVER DOZE
HOW LIKELY ARE YOU TO NOD OFF OR FALL ASLEEP WHEN YOU ARE A PASSENGER IN A CAR FOR AN HOUR WITHOUT A BREAK: WOULD NEVER DOZE

## 2025-08-28 ENCOUNTER — OFFICE VISIT (OUTPATIENT)
Dept: SLEEP MEDICINE | Facility: CLINIC | Age: 55
End: 2025-08-28
Payer: COMMERCIAL

## 2025-08-28 DIAGNOSIS — G43.711 INTRACTABLE CHRONIC MIGRAINE WITHOUT AURA AND WITH STATUS MIGRAINOSUS: ICD-10-CM

## 2025-08-28 DIAGNOSIS — G47.34 SLEEP RELATED HYPOXIA: ICD-10-CM

## 2025-08-28 DIAGNOSIS — R06.83 SNORING: ICD-10-CM

## 2025-08-28 DIAGNOSIS — G47.9 SLEEP DISTURBANCE: ICD-10-CM
